# Patient Record
Sex: MALE | Race: WHITE | NOT HISPANIC OR LATINO | Employment: FULL TIME | URBAN - METROPOLITAN AREA
[De-identification: names, ages, dates, MRNs, and addresses within clinical notes are randomized per-mention and may not be internally consistent; named-entity substitution may affect disease eponyms.]

---

## 2019-02-10 ENCOUNTER — HOSPITAL ENCOUNTER (EMERGENCY)
Facility: CLINIC | Age: 49
Discharge: HOME | End: 2019-02-10
Attending: EMERGENCY MEDICINE | Admitting: EMERGENCY MEDICINE
Payer: COMMERCIAL

## 2019-02-10 ENCOUNTER — APPOINTMENT (OUTPATIENT)
Dept: CT IMAGING | Facility: CLINIC | Age: 49
End: 2019-02-10
Attending: EMERGENCY MEDICINE
Payer: COMMERCIAL

## 2019-02-10 VITALS
WEIGHT: 185 LBS | TEMPERATURE: 97.7 F | DIASTOLIC BLOOD PRESSURE: 71 MMHG | RESPIRATION RATE: 18 BRPM | OXYGEN SATURATION: 99 % | HEART RATE: 102 BPM | BODY MASS INDEX: 27.32 KG/M2 | SYSTOLIC BLOOD PRESSURE: 124 MMHG

## 2019-02-10 DIAGNOSIS — R19.7 DIARRHEA, UNSPECIFIED TYPE: ICD-10-CM

## 2019-02-10 LAB
ALBUMIN SERPL-MCNC: 3.8 G/DL (ref 3.4–5)
ALP SERPL-CCNC: 83 U/L (ref 40–150)
ALT SERPL W P-5'-P-CCNC: 47 U/L (ref 0–70)
ANION GAP SERPL CALCULATED.3IONS-SCNC: 10 MMOL/L (ref 3–14)
AST SERPL W P-5'-P-CCNC: 32 U/L (ref 0–45)
BASOPHILS # BLD AUTO: 0 10E9/L (ref 0–0.2)
BASOPHILS NFR BLD AUTO: 1.1 %
BILIRUB SERPL-MCNC: 0.9 MG/DL (ref 0.2–1.3)
BUN SERPL-MCNC: 9 MG/DL (ref 7–30)
C COLI+JEJUNI+LARI FUSA STL QL NAA+PROBE: NOT DETECTED
C DIFF TOX B STL QL: NEGATIVE
CALCIUM SERPL-MCNC: 9 MG/DL (ref 8.5–10.1)
CHLORIDE SERPL-SCNC: 100 MMOL/L (ref 94–109)
CO2 SERPL-SCNC: 23 MMOL/L (ref 20–32)
CREAT BLD-MCNC: 0.7 MG/DL (ref 0.66–1.25)
CREAT SERPL-MCNC: 0.72 MG/DL (ref 0.66–1.25)
DIFFERENTIAL METHOD BLD: ABNORMAL
EC STX1 GENE STL QL NAA+PROBE: NOT DETECTED
EC STX2 GENE STL QL NAA+PROBE: NOT DETECTED
ENTERIC PATHOGEN COMMENT: NORMAL
EOSINOPHIL # BLD AUTO: 0.1 10E9/L (ref 0–0.7)
EOSINOPHIL NFR BLD AUTO: 2.2 %
ERYTHROCYTE [DISTWIDTH] IN BLOOD BY AUTOMATED COUNT: 12.1 % (ref 10–15)
GFR SERPL CREATININE-BSD FRML MDRD: >90 ML/MIN/{1.73_M2}
GFR SERPL CREATININE-BSD FRML MDRD: >90 ML/MIN/{1.73_M2}
GLUCOSE SERPL-MCNC: 108 MG/DL (ref 70–99)
HCT VFR BLD AUTO: 42.7 % (ref 40–53)
HGB BLD-MCNC: 14.5 G/DL (ref 13.3–17.7)
IMM GRANULOCYTES # BLD: 0 10E9/L (ref 0–0.4)
IMM GRANULOCYTES NFR BLD: 0.3 %
LIPASE SERPL-CCNC: 191 U/L (ref 73–393)
LYMPHOCYTES # BLD AUTO: 1 10E9/L (ref 0.8–5.3)
LYMPHOCYTES NFR BLD AUTO: 27 %
MCH RBC QN AUTO: 30.5 PG (ref 26.5–33)
MCHC RBC AUTO-ENTMCNC: 34 G/DL (ref 31.5–36.5)
MCV RBC AUTO: 90 FL (ref 78–100)
MONOCYTES # BLD AUTO: 0.6 10E9/L (ref 0–1.3)
MONOCYTES NFR BLD AUTO: 17.1 %
NEUTROPHILS # BLD AUTO: 1.9 10E9/L (ref 1.6–8.3)
NEUTROPHILS NFR BLD AUTO: 52.3 %
NOROV GI+II ORF1-ORF2 JNC STL QL NAA+PR: NOT DETECTED
NRBC # BLD AUTO: 0 10*3/UL
NRBC BLD AUTO-RTO: 0 /100
PLATELET # BLD AUTO: 185 10E9/L (ref 150–450)
POTASSIUM SERPL-SCNC: 3.2 MMOL/L (ref 3.4–5.3)
PROT SERPL-MCNC: 8.1 G/DL (ref 6.8–8.8)
RBC # BLD AUTO: 4.75 10E12/L (ref 4.4–5.9)
RVA NSP5 STL QL NAA+PROBE: NOT DETECTED
SALMONELLA SP RPOD STL QL NAA+PROBE: NOT DETECTED
SHIGELLA SP+EIEC IPAH STL QL NAA+PROBE: NOT DETECTED
SODIUM SERPL-SCNC: 133 MMOL/L (ref 133–144)
SPECIMEN SOURCE: NORMAL
V CHOL+PARA RFBL+TRKH+TNAA STL QL NAA+PR: NOT DETECTED
WBC # BLD AUTO: 3.6 10E9/L (ref 4–11)
Y ENTERO RECN STL QL NAA+PROBE: NOT DETECTED

## 2019-02-10 PROCEDURE — 87506 IADNA-DNA/RNA PROBE TQ 6-11: CPT | Performed by: EMERGENCY MEDICINE

## 2019-02-10 PROCEDURE — 85025 COMPLETE CBC W/AUTO DIFF WBC: CPT | Performed by: EMERGENCY MEDICINE

## 2019-02-10 PROCEDURE — 25000128 H RX IP 250 OP 636: Performed by: EMERGENCY MEDICINE

## 2019-02-10 PROCEDURE — 80053 COMPREHEN METABOLIC PANEL: CPT | Performed by: EMERGENCY MEDICINE

## 2019-02-10 PROCEDURE — 82565 ASSAY OF CREATININE: CPT | Mod: 91

## 2019-02-10 PROCEDURE — 25000132 ZZH RX MED GY IP 250 OP 250 PS 637: Performed by: EMERGENCY MEDICINE

## 2019-02-10 PROCEDURE — 87493 C DIFF AMPLIFIED PROBE: CPT | Mod: 59 | Performed by: EMERGENCY MEDICINE

## 2019-02-10 PROCEDURE — 96361 HYDRATE IV INFUSION ADD-ON: CPT

## 2019-02-10 PROCEDURE — 99285 EMERGENCY DEPT VISIT HI MDM: CPT | Mod: 25

## 2019-02-10 PROCEDURE — 96374 THER/PROPH/DIAG INJ IV PUSH: CPT | Mod: 59

## 2019-02-10 PROCEDURE — 83690 ASSAY OF LIPASE: CPT | Performed by: EMERGENCY MEDICINE

## 2019-02-10 PROCEDURE — 74177 CT ABD & PELVIS W/CONTRAST: CPT

## 2019-02-10 RX ORDER — BUPROPION HYDROCHLORIDE 300 MG/1
TABLET ORAL
COMMUNITY
Start: 2018-05-21 | End: 2022-12-07

## 2019-02-10 RX ORDER — IOPAMIDOL 755 MG/ML
500 INJECTION, SOLUTION INTRAVASCULAR ONCE
Status: COMPLETED | OUTPATIENT
Start: 2019-02-10 | End: 2019-02-10

## 2019-02-10 RX ORDER — POTASSIUM CHLORIDE 1500 MG/1
40 TABLET, EXTENDED RELEASE ORAL ONCE
Status: COMPLETED | OUTPATIENT
Start: 2019-02-10 | End: 2019-02-10

## 2019-02-10 RX ORDER — ONDANSETRON 4 MG/1
4 TABLET, ORALLY DISINTEGRATING ORAL EVERY 8 HOURS PRN
Qty: 10 TABLET | Refills: 0 | Status: SHIPPED | OUTPATIENT
Start: 2019-02-10 | End: 2019-02-13

## 2019-02-10 RX ORDER — ONDANSETRON 2 MG/ML
4 INJECTION INTRAMUSCULAR; INTRAVENOUS EVERY 30 MIN PRN
Status: DISCONTINUED | OUTPATIENT
Start: 2019-02-10 | End: 2019-02-10 | Stop reason: HOSPADM

## 2019-02-10 RX ORDER — ASPIRIN 81 MG/1
81 TABLET, CHEWABLE ORAL DAILY
COMMUNITY
Start: 2018-09-07

## 2019-02-10 RX ADMIN — ONDANSETRON 4 MG: 2 INJECTION INTRAMUSCULAR; INTRAVENOUS at 10:03

## 2019-02-10 RX ADMIN — POTASSIUM CHLORIDE 40 MEQ: 1500 TABLET, EXTENDED RELEASE ORAL at 12:03

## 2019-02-10 RX ADMIN — SODIUM CHLORIDE 1000 ML: 9 INJECTION, SOLUTION INTRAVENOUS at 09:58

## 2019-02-10 RX ADMIN — SODIUM CHLORIDE 63 ML: 9 INJECTION, SOLUTION INTRAVENOUS at 11:20

## 2019-02-10 RX ADMIN — IOPAMIDOL 93 ML: 755 INJECTION, SOLUTION INTRAVENOUS at 11:20

## 2019-02-10 ASSESSMENT — ENCOUNTER SYMPTOMS
SHORTNESS OF BREATH: 0
DIARRHEA: 1
NAUSEA: 1
FEVER: 1
VOMITING: 0
ABDOMINAL PAIN: 1
BLOOD IN STOOL: 1
COUGH: 0

## 2019-02-10 NOTE — ED AVS SNAPSHOT
[facility] Emergency Department  [address]  [facility] [address]  Phone:  [phone]  Fax:  [phone]                                    Nazario Truong   MRN: [id number]    Department:  [facility] Emergency Department   Date of Visit:  2/10/2019           After Visit Summary Signature Page    I have received my discharge instructions, and my questions have been answered. I have discussed any challenges I see with this plan with the nurse or doctor.    ..........................................................................................................................................  Patient/Patient Representative Signature      ..........................................................................................................................................  Patient Representative Print Name and Relationship to Patient    ..................................................               ................................................  Date                                   Time    ..........................................................................................................................................  Reviewed by Signature/Title    ...................................................              ..............................................  Date                                               Time          22EPIC Rev 08/18

## 2019-02-10 NOTE — ED PROVIDER NOTES
History     Chief Complaint:  Diarrhea    HPI   Nazario Truong is a 48 year old male, with a history of HTN and HDL, who presents to the emergency department for evaluation of diarrhea. The patient reports he has been experiencing diarrhea, nausea, and fever for 3 days prior to evaluation. He reports some diffuse sharp abdominal pain on Friday that now exhibits as some abdominal bloating. He reports some periodical blood in his stool though none today. He reports his fever was 102 three days ago, 101 yesterday and then he had a temperature of 99 this morning. He reports 20-30 episodes of diarrhea yesterday and about 6 episodes today. He denies any vomiting, chest pain, shortness of breath, cough, or congestion. He reports he has been unable to drink or eat anything without experiencing diarrhea. He reports he has eaten some yogurt today which has been tolerated well today. He denies any recent travel or antibiotic use. He notes a history of possible colitis in past.. He denies any history of IBS.    Allergies:  No known drug allergies     Medications:    Adderall  Lipitor  Clonazepam  Flexeril  Gabapentin  Hydrodiuril  Lisinopril  Nicotine    Past Medical History:    HTN  HDL  ADD  Anxiety    Past Surgical History:    Parathyroid surgery  Appendectomy    Family History:    HTN  Lipids  CAD    Social History:  Smoking status: Former smoker of 25 years. Quit date: 1/5/10  Alcohol use: Yes  The patient presents to the emergency department by himself.  Marital Status:   [2]     Review of Systems   Constitutional: Positive for fever.   HENT: Negative for congestion.    Respiratory: Negative for cough and shortness of breath.    Cardiovascular: Negative for chest pain.   Gastrointestinal: Positive for abdominal pain, blood in stool, diarrhea and nausea. Negative for vomiting.   All other systems reviewed and are negative.    Physical Exam   Patient Vitals for the past 24 hrs:   BP Temp Temp src Pulse Resp SpO2  Weight   02/10/19 1240 -- -- -- -- 18 -- --   02/10/19 1200 124/71 -- -- 102 -- 99 % --   02/10/19 1145 127/77 -- -- 107 -- 97 % --   02/10/19 1037 131/80 -- -- 102 -- 97 % --   02/10/19 0959 -- -- -- -- -- 95 % --   02/10/19 0942 -- -- -- -- -- -- 83.9 kg (185 lb)   02/10/19 0929 (!) 166/108 97.7  F (36.5  C) Oral 120 18 98 % --     Physical Exam  General: Resting comfortably on the gurney  Eyes:  The pupils are equal and round    Conjunctivae and sclerae are normal  ENT:    Moist mucous membranes  Neck:  Normal range of motion  CV:  Tachycardic rate and regular rhythm    Skin warm and well perfused   Resp:  Lungs are clear    Non-labored    No rales    No wheezing   GI:  Abdomen is soft, there is no rigidity    No distension    No rebound tenderness     Mild left sided abdominal tenderness  MS:  Normal muscular tone  Skin:  No rash or acute skin lesions noted  Neuro:   Awake, alert.      Speech is normal and fluent.    Face is symmetric.     Moves all extremities equally  Psych: Normal affect.  Appropriate interactions.    Emergency Department Course   Imaging:  Radiographic findings were communicated with the patient who voiced understanding of the findings.    CT Abdomen Pelvis w Contrast  IMPRESSION: No acute process demonstrated in the abdomen and pelvis.  As read by Radiology.    Laboratory:  CBC: WBC 3.6 (L) o/w WNL (HGB 14.5, )  CMP: Potassium 3.2 (L), Glucose 108 (H) o/w WNL (Creatinine 0.72)  Lipase: 191  Creatinine POCT: AWNL    Clostridium difficile toxin B PCR: pending  Enteric Bacteria and Virus Panel by RIGOBERTO stool: pending    Interventions:  0958 NS 1L IV Bolus  1003 Zofran 4 mg IV    Emergency Department Course:  Past medical records, nursing notes, and vitals reviewed.  0928: I performed an exam of the patient and obtained history, as documented above.    IV inserted and blood drawn.    The patient was sent for an abdominal pelvis CT while in the emergency department, findings above.      Findings and plan explained to the Patient. Patient discharged home with instructions regarding supportive care, medications, and reasons to return. The importance of close follow-up was reviewed.   Impression & Plan    Medical Decision Making:  Nazario Truong is a 48 year old male who presents for evaluation of diarrhea. I considered a broad differential of course; the differential diagnosis of diarrhea is broad and includes such etiologies as viral gastroenteritis, traveler's diarrhea, giardia, colitis, GI bleed, bacterial infection of the large intestine (salmonella, shigella, campylobacter, e coli, etc), parasite, etc.  There are no signs of worrisome intra-abdominal pathologies detected during the visit today and no blood per patient report.  The patient has minimal left sided abdominal tenderness on exam. Did obtain stool studies which are pending. Did obtain a CT of the abdomen, which was negative. Supportive outpatient management is therefore indicated.  Abdominal pain precautions are given for home. It was discussed with him to return to the ED for increasing pain, fevers not resolving or dehydration.    Diagnosis:    ICD-10-CM   1. Diarrhea, unspecified type R19.7     Disposition:  Discharged to home with instructions for follow up.  Discharge Medications:  Started    ondansetron 4 MG ODT tab  Commonly known as:  ZOFRAN ODT  4 mg, Oral, EVERY 8 HOURS PRN       Tiffany Platt  2/10/2019   [facility] EMERGENCY DEPARTMENT  Scribe Disclosure:  Tiffany SMALLS, am serving as a scribe at 9:28 AM on 2/10/2019 to document services personally performed by Maliha Oakes MD based on my observations and the provider's statements to me.      Maliha Oakes MD  02/10/19 0013

## 2019-02-10 NOTE — DISCHARGE INSTRUCTIONS
You will receive a call if the stool studies come back abnormal  Continue the imodium  Use zofran for nausea as needed  Discharge Instructions  Adult Diarrhea    You have been seen today for diarrhea (loose stools). This is usually caused by a virus, but some bacteria, parasites, medicines, or other medical conditions can cause similar symptoms. At this time your provider does not find that your diarrhea is a sign of anything dangerous or life-threatening. However, sometimes the signs of serious illness do not show up right away. If you have new or worse symptoms, you may need to be seen again in the Emergency Department or by your primary provider.     Generally, every Emergency Department visit should have a follow-up clinic visit with either a primary or a specialty clinic/provider. Please follow-up as instructed by your emergency provider today.    Return to the Emergency Department if:  You feel you are getting dehydrated, such as being very thirsty, not urinating (peeing) like usual, or feeling faint or lightheaded.   You develop a new fever.  You have abdominal (belly) pain that seems worse than cramps, is in one spot, or is getting worse over time.   You have blood in your stool or your stool becomes black.  (Remember that if you take Pepto-Bismol , this will turn your stool black).   You feel very weak.    What can I do to help myself?  The most important thing to do is to drink clear liquids.   It is best to have only small, frequent sips of liquids. Drinking too much at once may cause more diarrhea. You should also replace minerals, sodium and potassium lost with diarrhea. Pedialyte  and sports drinks can help you replace these minerals. You can also drink clear liquids such as water, weak tea, apple juice, and 7-Up . Avoid acidic liquids (orange juice), caffeine (coffee) or alcohol. Milk products will make the diarrhea worse.  Eat bland (plain) foods. Soda crackers, toast, plain noodles, gelatin,  "applesauce and bananas are good first choices. Avoid foods that have acid, are spicy, fatty or fibrous (such as meats, coarse grains, vegetables). You may start eating these foods again in about 3 days when you are better.   Sometimes treatment includes prescription medicine to prevent diarrhea. If your provider prescribes these for you, take them as directed.   Nonprescription medicine is available for the treatment of diarrhea and can be very effective. If you use it, make sure you use the dose recommended on the package. Check with your healthcare provider before you use any medicine for diarrhea.   Do not take ibuprofen, or other nonsteroidal anti-inflammatory medicines, without checking with your healthcare provider.   Probiotics: If you have been given an antibiotic, you may want to also take a probiotic pill or eat yogurt with live cultures. Probiotics have \"good bacteria\" to help your intestines stay healthy. Studies have shown that probiotics help prevent diarrhea and other intestine problems (including C. diff infection) when you take antibiotics. You can buy these without a prescription in the pharmacy section of the store.   If you were given a prescription for medicine here today, be sure to read all of the information (including the package insert) that comes with your prescription.  This will include important information about the medicine, its side effects, and any warnings that you need to know about.  The pharmacist who fills the prescription can provide more information and answer questions you may have about the medicine.  If you have questions or concerns that the pharmacist cannot address, please call or return to the Emergency Department.  Remember that you can always come back to the Emergency Department if you are not able to see your regular provider in the amount of time listed above, if you get any new symptoms, or if there is anything that worries you.    "

## 2019-02-10 NOTE — ED TRIAGE NOTES
Pt reports 3 days of fever, vomiting, and diarrhea. Pt states he had 25-30 episodes of diarrhea yesterday, 6 so far today. Pt unable to keep much down. Pt has also been having abd pain, states now he feels bloated. Highest temp at home was 102.

## 2019-12-09 ENCOUNTER — HEALTH MAINTENANCE LETTER (OUTPATIENT)
Age: 49
End: 2019-12-09

## 2021-05-30 ENCOUNTER — RECORDS - HEALTHEAST (OUTPATIENT)
Dept: ADMINISTRATIVE | Facility: CLINIC | Age: 51
End: 2021-05-30

## 2022-05-24 ENCOUNTER — APPOINTMENT (OUTPATIENT)
Dept: GENERAL RADIOLOGY | Facility: CLINIC | Age: 52
End: 2022-05-24
Attending: EMERGENCY MEDICINE
Payer: COMMERCIAL

## 2022-05-24 ENCOUNTER — HOSPITAL ENCOUNTER (EMERGENCY)
Facility: CLINIC | Age: 52
Discharge: HOME | End: 2022-05-24
Attending: EMERGENCY MEDICINE | Admitting: EMERGENCY MEDICINE
Payer: COMMERCIAL

## 2022-05-24 VITALS
BODY MASS INDEX: 25.43 KG/M2 | OXYGEN SATURATION: 98 % | SYSTOLIC BLOOD PRESSURE: 136 MMHG | WEIGHT: 172.18 LBS | HEART RATE: 94 BPM | RESPIRATION RATE: 20 BRPM | DIASTOLIC BLOOD PRESSURE: 94 MMHG | TEMPERATURE: 98.7 F

## 2022-05-24 DIAGNOSIS — J20.9 BRONCHITIS WITH BRONCHOSPASM: ICD-10-CM

## 2022-05-24 DIAGNOSIS — R07.9 CHEST PAIN, UNSPECIFIED TYPE: ICD-10-CM

## 2022-05-24 DIAGNOSIS — R06.02 SHORTNESS OF BREATH: ICD-10-CM

## 2022-05-24 LAB
ALBUMIN UR-MCNC: NEGATIVE MG/DL
ANION GAP SERPL CALCULATED.3IONS-SCNC: 6 MMOL/L (ref 3–14)
APPEARANCE UR: CLEAR
BASOPHILS # BLD AUTO: 0.1 10E3/UL (ref 0–0.2)
BASOPHILS NFR BLD AUTO: 1 %
BILIRUB UR QL STRIP: NEGATIVE
BUN SERPL-MCNC: 18 MG/DL (ref 7–30)
CALCIUM SERPL-MCNC: 9.6 MG/DL (ref 8.5–10.1)
CHLORIDE BLD-SCNC: 100 MMOL/L (ref 94–109)
CO2 SERPL-SCNC: 27 MMOL/L (ref 20–32)
COLOR UR AUTO: NORMAL
CREAT SERPL-MCNC: 0.62 MG/DL (ref 0.66–1.25)
D DIMER PPP FEU-MCNC: 0.29 UG/ML FEU (ref 0–0.5)
EOSINOPHIL # BLD AUTO: 0.1 10E3/UL (ref 0–0.7)
EOSINOPHIL NFR BLD AUTO: 2 %
ERYTHROCYTE [DISTWIDTH] IN BLOOD BY AUTOMATED COUNT: 12.1 % (ref 10–15)
GFR SERPL CREATININE-BSD FRML MDRD: >90 ML/MIN/1.73M2
GLUCOSE BLD-MCNC: 91 MG/DL (ref 70–99)
GLUCOSE UR STRIP-MCNC: NEGATIVE MG/DL
HCT VFR BLD AUTO: 44 % (ref 40–53)
HGB BLD-MCNC: 14.9 G/DL (ref 13.3–17.7)
HGB UR QL STRIP: NEGATIVE
IMM GRANULOCYTES # BLD: 0 10E3/UL
IMM GRANULOCYTES NFR BLD: 1 %
KETONES UR STRIP-MCNC: NEGATIVE MG/DL
LEUKOCYTE ESTERASE UR QL STRIP: NEGATIVE
LYMPHOCYTES # BLD AUTO: 1.4 10E3/UL (ref 0.8–5.3)
LYMPHOCYTES NFR BLD AUTO: 22 %
MCH RBC QN AUTO: 30.6 PG (ref 26.5–33)
MCHC RBC AUTO-ENTMCNC: 33.9 G/DL (ref 31.5–36.5)
MCV RBC AUTO: 90 FL (ref 78–100)
MONOCYTES # BLD AUTO: 0.8 10E3/UL (ref 0–1.3)
MONOCYTES NFR BLD AUTO: 12 %
NEUTROPHILS # BLD AUTO: 4.1 10E3/UL (ref 1.6–8.3)
NEUTROPHILS NFR BLD AUTO: 62 %
NITRATE UR QL: NEGATIVE
NRBC # BLD AUTO: 0 10E3/UL
NRBC BLD AUTO-RTO: 0 /100
NT-PROBNP SERPL-MCNC: 8 PG/ML (ref 0–900)
PH UR STRIP: 6.5 [PH] (ref 5–7)
PLATELET # BLD AUTO: 260 10E3/UL (ref 150–450)
POTASSIUM BLD-SCNC: 3.8 MMOL/L (ref 3.4–5.3)
RBC # BLD AUTO: 4.87 10E6/UL (ref 4.4–5.9)
RBC URINE: <1 /HPF
SODIUM SERPL-SCNC: 133 MMOL/L (ref 133–144)
SP GR UR STRIP: 1.01 (ref 1–1.03)
TROPONIN I SERPL HS-MCNC: 4 NG/L
UROBILINOGEN UR STRIP-MCNC: NORMAL MG/DL
WBC # BLD AUTO: 6.5 10E3/UL (ref 4–11)
WBC URINE: 1 /HPF

## 2022-05-24 PROCEDURE — 250N000013 HC RX MED GY IP 250 OP 250 PS 637: Performed by: EMERGENCY MEDICINE

## 2022-05-24 PROCEDURE — 82435 ASSAY OF BLOOD CHLORIDE: CPT | Performed by: EMERGENCY MEDICINE

## 2022-05-24 PROCEDURE — 99285 EMERGENCY DEPT VISIT HI MDM: CPT | Mod: 25

## 2022-05-24 PROCEDURE — C9803 HOPD COVID-19 SPEC COLLECT: HCPCS

## 2022-05-24 PROCEDURE — 83880 ASSAY OF NATRIURETIC PEPTIDE: CPT | Performed by: EMERGENCY MEDICINE

## 2022-05-24 PROCEDURE — 71046 X-RAY EXAM CHEST 2 VIEWS: CPT

## 2022-05-24 PROCEDURE — 81001 URINALYSIS AUTO W/SCOPE: CPT | Performed by: EMERGENCY MEDICINE

## 2022-05-24 PROCEDURE — 93005 ELECTROCARDIOGRAM TRACING: CPT

## 2022-05-24 PROCEDURE — 94640 AIRWAY INHALATION TREATMENT: CPT

## 2022-05-24 PROCEDURE — 85379 FIBRIN DEGRADATION QUANT: CPT | Performed by: EMERGENCY MEDICINE

## 2022-05-24 PROCEDURE — 85025 COMPLETE CBC W/AUTO DIFF WBC: CPT | Performed by: EMERGENCY MEDICINE

## 2022-05-24 PROCEDURE — 36415 COLL VENOUS BLD VENIPUNCTURE: CPT | Performed by: EMERGENCY MEDICINE

## 2022-05-24 PROCEDURE — 84484 ASSAY OF TROPONIN QUANT: CPT | Performed by: EMERGENCY MEDICINE

## 2022-05-24 PROCEDURE — 81003 URINALYSIS AUTO W/O SCOPE: CPT | Performed by: EMERGENCY MEDICINE

## 2022-05-24 RX ORDER — INHALER, ASSIST DEVICES
1 SPACER (EA) MISCELLANEOUS ONCE
Status: DISCONTINUED | OUTPATIENT
Start: 2022-05-24 | End: 2022-05-24 | Stop reason: HOSPADM

## 2022-05-24 RX ORDER — CODEINE PHOSPHATE AND GUAIFENESIN 10; 100 MG/5ML; MG/5ML
1-2 SOLUTION ORAL EVERY 6 HOURS PRN
Qty: 118 ML | Refills: 0 | Status: SHIPPED | OUTPATIENT
Start: 2022-05-24 | End: 2022-12-07

## 2022-05-24 RX ORDER — ALBUTEROL SULFATE 90 UG/1
6 AEROSOL, METERED RESPIRATORY (INHALATION) ONCE
Status: COMPLETED | OUTPATIENT
Start: 2022-05-24 | End: 2022-05-24

## 2022-05-24 RX ORDER — METHYLPREDNISOLONE 4 MG
TABLET, DOSE PACK ORAL
Qty: 21 EACH | Refills: 0 | Status: SHIPPED | OUTPATIENT
Start: 2022-05-24 | End: 2022-12-07

## 2022-05-24 RX ORDER — BENZONATATE 100 MG/1
100 CAPSULE ORAL 3 TIMES DAILY PRN
Qty: 15 CAPSULE | Refills: 0 | Status: SHIPPED | OUTPATIENT
Start: 2022-05-24 | End: 2022-12-07

## 2022-05-24 RX ADMIN — ALBUTEROL SULFATE 6 PUFF: 90 AEROSOL, METERED RESPIRATORY (INHALATION) at 18:22

## 2022-05-24 ASSESSMENT — ENCOUNTER SYMPTOMS
FATIGUE: 1
COUGH: 1
GASTROINTESTINAL NEGATIVE: 1
MYALGIAS: 0
SHORTNESS OF BREATH: 1
FEVER: 0

## 2022-05-24 NOTE — ED TRIAGE NOTES
Triage Assessment     Row Name 05/24/22 0175       Triage Assessment (Adult)    Airway WDL airway symptoms;WDL       Respiratory WDL    Respiratory WDL all  lungs clear    Cough Frequency frequent    Cough Type good;nonproductive       Skin Circulation/Temperature WDL    Skin Circulation/Temperature WDL WDL       Cardiac WDL    Cardiac WDL WDL       Peripheral/Neurovascular WDL    Peripheral Neurovascular WDL WDL       Cognitive/Neuro/Behavioral WDL    Cognitive/Neuro/Behavioral WDL all    Level of Consciousness alert    Arousal Level opens eyes spontaneously    Orientation oriented x 4    Speech spontaneous;clear    Mood/Behavior anxious;calm;cooperative

## 2022-05-24 NOTE — ED TRIAGE NOTES
PT comes in with SOB and chest pain. Pt had positive covid last week. Pt felt better yesterday and was out pulling weeds when chest pain and SOB started and continued after he stopped his yard work. Pt called primary to see if he should get in sooner and was told to come to the ER. Pt has adult asthma, and has a inhaler-he did use his inhaler but not without difficulty because he was concerned that it might elevate his blood pressure. He felt his palms were sweaty, headache and he was jittery. Pt staTES he feels like his breathing has continued to worsen.

## 2022-05-24 NOTE — ED PROVIDER NOTES
History     Chief Complaint:  Shortness of Breath and Covid Concern     HPI   Nazario Truong is a 51 year old male who presents with chest pain and shortness of breath.  Patient reports testing positive for COVID-19 5/17.  Initial symptoms were low-grade fever, loss of smell, body aches, cough, shortness of breath.  Felt like his symptoms were improving throughout this week.  He is COVID vaccinated and boosted.  He did not have COVID-19 antivirals as he is outside of the window by the time he was able to get a hold of his PCP.  Yesterday, between 5-7 PM he was pulling weeds when he developed sharp sternal chest pain that lasted for 2 hours.  This eventually went away but the sensation where he could not get a full breath in addition to chest tightness and heaviness persisted throughout the day today.  There was no radiation of the pain to the jaw or down his arms.  He did feel sweaty at the time of the onset of pain.  He denies any history of exertional chest pain.  He denies any high fever, leg swelling, history of VTE.  He did take his albuterol rescue inhaler this morning which seemed to help.    ROS:  Review of Systems   Constitutional: Positive for fatigue. Negative for fever.   Respiratory: Positive for cough and shortness of breath.    Cardiovascular: Positive for chest pain. Negative for leg swelling.   Gastrointestinal: Negative.    Musculoskeletal: Negative for myalgias.   All other systems reviewed and are negative.     Allergies:  Cat Dander   Grass Pollen   Ragweed Pollen   Tree And Shrub Pollen     Medications:    albuterol (PROAIR RESPICLICK) 108 (90 Base) MCG/ACT inhaler  amphetamine-dextroamphetamine (ADDERALL) 15 MG tablet  aspirin (ASA) 81 MG chewable tablet  atorvastatin (LIPITOR) 20 MG tablet  buPROPion (WELLBUTRIN XL) 300 MG 24 hr tablet  fluticasone-vilanterol (BREO ELLIPTA) 100-25 MCG/INH inhaler  hydrochlorothiazide (HYDRODIURIL) 25 MG tablet  lisinopril (PRINIVIL,ZESTRIL) 20 MG  tablet  metFORMIN (GLUCOPHAGE) 500 MG tablet  Nicotine Polacrilex (NICORETTE MT)  clonazePAM (KLONOPIN) 1 mg tablet     dextroamphetamine-amphetamine (ADDERALL) 20 mg tablet     buPROPion (WELLBUTRIN XL) 300 mg Extended-Release tablet     EPINEPHrine (EPIPEN) 0.3 mg/0.3 mL injection    sildenafiL, pulm.hypertension, (REVATIO) 20 mg tablet    clobetasol 0.05% TOPICAL (TEMOVATE) 0.05 % external solution       Past Medical History:    ADD (attention deficit disorder)   Anxiety   HTN (hypertension)   Hyperlipidemia LDL goal < 130   Lumbar disc herniation     Past Surgical History:    Parathyroid surgery   Appendectomy      Family History:    family history includes C.A.D. in his father and mother; Hypertension in his father and mother; Lipids in his father and mother.    Social History:   reports that he quit smoking about 12 years ago. His smoking use included cigarettes. He quit after 25.00 years of use. He does not have any smokeless tobacco history on file. He reports current alcohol use. He reports that he does not use drugs.  PCP: [facility], [place]     Physical Exam     Patient Vitals for the past 24 hrs:   BP Temp Temp src Pulse Resp SpO2 Weight   05/24/22 1830 (!) 143/92 -- -- 83 -- 95 % --   05/24/22 1750 -- -- -- 83 -- 97 % --   05/24/22 1745 (!) 155/98 -- -- -- -- -- --   05/24/22 1405 (!) 159/80 98.7  F (37.1  C) Oral -- 20 97 % 78.1 kg (172 lb 2.9 oz)      Physical Exam  General: Alert, no acute distress; speaking in full sentences; occasional dry cough.  HEENT:  Moist mucous membranes. Conjunctiva normal.   CV:  RRR, no m/r/g, skin warm and well perfused  Pulm:  Faint expiratory wheezing bilaterally.  No rales. No acute distress, breathing comfortably.  GI:  Soft, nontender, nondistended.  No rebound or guarding.    MSK:  Moving all extremities.  No focal areas of edema, erythema, or tenderness  Skin:  WWP, no rashes, no lower extremity edema, skin color normal, no diaphoresis    Emergency  Department Course   ECG:  ECG taken at 1746, ECG read at 1753  Normal sinus rhythm    Rightward axis  Nonspecific ST and T wave abnormality  Abnormal ECG   Rate 80 bpm. ID interval 150 ms. QRS duration 102 ms. QT/QTc 374/431 ms. P-R-T axes * 94 165.     No results found for this or any previous visit.    Imaging:  Chest XR,  PA & LAT   Final Result   IMPRESSION: Negative chest.         Report per radiology    Laboratory:  Labs Ordered and Resulted from Time of ED Arrival to Time of ED Departure   BASIC METABOLIC PANEL - Abnormal       Result Value    Sodium 133      Potassium 3.8      Chloride 100      Carbon Dioxide (CO2) 27      Anion Gap 6      Urea Nitrogen 18      Creatinine 0.62 (*)     Calcium 9.6      Glucose 91      GFR Estimate >90     ROUTINE UA WITH MICROSCOPIC REFLEX TO CULTURE - Normal    Color Urine Straw      Appearance Urine Clear      Glucose Urine Negative      Bilirubin Urine Negative      Ketones Urine Negative      Specific Gravity Urine 1.007      Blood Urine Negative      pH Urine 6.5      Protein Albumin Urine Negative      Urobilinogen Urine Normal      Nitrite Urine Negative      Leukocyte Esterase Urine Negative      RBC Urine <1      WBC Urine 1     NT PROBNP INPATIENT - Normal    N terminal Pro BNP Inpatient 8     TROPONIN I - Normal    Troponin I High Sensitivity 4     D DIMER QUANTITATIVE - Normal    D-Dimer Quantitative 0.29     CBC WITH PLATELETS AND DIFFERENTIAL    WBC Count 6.5      RBC Count 4.87      Hemoglobin 14.9      Hematocrit 44.0      MCV 90      MCH 30.6      MCHC 33.9      RDW 12.1      Platelet Count 260      % Neutrophils 62      % Lymphocytes 22      % Monocytes 12      % Eosinophils 2      % Basophils 1      % Immature Granulocytes 1      NRBCs per 100 WBC 0      Absolute Neutrophils 4.1      Absolute Lymphocytes 1.4      Absolute Monocytes 0.8      Absolute Eosinophils 0.1      Absolute Basophils 0.1      Absolute Immature Granulocytes 0.0      Absolute NRBCs 0.0         Procedures     Emergency Department Course:     Reviewed:   I reviewed nursing notes, vitals, past medical history and Care Everywhere    Assessments:  1738 I obtained history and examined the patient as noted above.   1901 I rechecked the patient and explained findings.     Interventions:  1822  albuterol (PROVENTIL HFA/VENTOLIN HFA) inhaler 6 puff, Inhalation      Disposition:  The patient was discharged to home.     Impression & Plan      Medical Decision Making:  Nazario Truong is a 51 year old male who presents to the ER for evaluation of chest pain, shortness of breath, cough in setting of COVID-19 positive diagnosis 5/17.  He is afebrile and vitally stable and not hypoxic.  He does not appear to be in any respiratory distress.  Faint wheezing heard throughout lung fields on expiration.  Broad differential was considered including ACS, PE, pneumothorax, COVID-19 pneumonia, superimposed bacterial pneumonia, pleural effusion, bronchitis and bronchospasms amongst other things.  EKG shows no acute ischemic changes.  No signs of pericarditis.  No previous EKGs to compare.  Troponin is normal despite greater than 6 hours of ongoing symptoms ruling out ACS.  Patient's heart score is low.  Patient is low risk for PE and D-dimer is negative making this unlikely.  The rest of his basic lab studies are unremarkable.  He does not appear to be fluid overloaded and BNP is normal.  Chest x-ray shows no acute findings.  Suspect acute bronchitis with bronchospasms given reassuring work-up and exam.  With reasonable clinical certainty, he is safe to discharge home with the below medications.  Follow-up with PCP discussed regarding today's visit.  He is comfortable with this plan.  Reasons to return to the ER were discussed and all questions were answered prior to discharge    Diagnosis:    ICD-10-CM    1. Chest pain, unspecified type  R07.9    2. Shortness of breath  R06.02    3. Bronchitis with bronchospasm  J20.9        Discharge Medications:  New Prescriptions    BENZONATATE (TESSALON) 100 MG CAPSULE    Take 1 capsule (100 mg) by mouth 3 times daily as needed for cough    GUAIFENESIN-CODEINE (ROBITUSSIN AC) 100-10 MG/5ML SOLUTION    Take 5-10 mLs by mouth every 6 hours as needed for cough    METHYLPREDNISOLONE (MEDROL DOSEPAK) 4 MG TABLET THERAPY PACK    Take as directed on the therapy pack      5/24/2022   Zachary Ndiaye MD [place], Edgar Ronald, MD  05/24/22 1949

## 2022-05-24 NOTE — ED TRIAGE NOTES
PT comes in with SOB and chest pain. Pt had positive covid last week. Pt felt better yesterday and was out pulling weeds when chest pain and SOB started and continued after he stopped his yard work. Pt called primary to see if he should get in sooner and was told to come to the ER. Pt has adult asthma, and has a inhaler-he did use his inhaler but not without difficulty because he was concerned that it might elevate his blood pressure. He felt his palms were sweaty, headache and he was jittery. Pt staTES he feels like his breathing has continued to worsen.     Triage Assessment     Row Name 05/24/22 1559       Triage Assessment (Adult)    Airway WDL airway symptoms;WDL       Respiratory WDL    Respiratory WDL all  lungs clear    Cough Frequency frequent    Cough Type good;nonproductive       Skin Circulation/Temperature WDL    Skin Circulation/Temperature WDL WDL       Cardiac WDL    Cardiac WDL WDL       Peripheral/Neurovascular WDL    Peripheral Neurovascular WDL WDL       Cognitive/Neuro/Behavioral WDL    Cognitive/Neuro/Behavioral WDL all    Level of Consciousness alert    Arousal Level opens eyes spontaneously    Orientation oriented x 4    Speech spontaneous;clear    Mood/Behavior anxious;calm;cooperative

## 2022-05-25 NOTE — DISCHARGE INSTRUCTIONS

## 2022-06-03 ENCOUNTER — APPOINTMENT (OUTPATIENT)
Dept: URBAN - METROPOLITAN AREA CLINIC 253 | Age: 52
Setting detail: DERMATOLOGY
End: 2022-06-03

## 2022-06-03 VITALS — WEIGHT: 165 LBS | HEIGHT: 68 IN | RESPIRATION RATE: 15 BRPM

## 2022-06-03 DIAGNOSIS — L40.0 PSORIASIS VULGARIS: ICD-10-CM

## 2022-06-03 PROBLEM — L30.9 DERMATITIS, UNSPECIFIED: Status: ACTIVE | Noted: 2022-06-03

## 2022-06-03 PROCEDURE — OTHER MIPS QUALITY: OTHER

## 2022-06-03 PROCEDURE — OTHER PRESCRIPTION: OTHER

## 2022-06-03 PROCEDURE — OTHER COUNSELING: OTHER

## 2022-06-03 PROCEDURE — 99203 OFFICE O/P NEW LOW 30 MIN: CPT

## 2022-06-03 PROCEDURE — OTHER ADDITIONAL NOTES: OTHER

## 2022-06-03 RX ORDER — KETOCONAZOLE 20 MG/ML
2% SHAMPOO, SUSPENSION TOPICAL QOD
Qty: 120 | Refills: 3 | Status: ERX | COMMUNITY
Start: 2022-06-03

## 2022-06-03 RX ORDER — CLOBETASOL PROPIONATE 0.46 MG/ML
0.05% SOLUTION TOPICAL BID
Qty: 25 | Refills: 3 | Status: ERX | COMMUNITY
Start: 2022-06-03

## 2022-06-03 ASSESSMENT — LOCATION ZONE DERM: LOCATION ZONE: SCALP

## 2022-06-03 ASSESSMENT — LOCATION SIMPLE DESCRIPTION DERM: LOCATION SIMPLE: LEFT SCALP

## 2022-06-03 ASSESSMENT — LOCATION DETAILED DESCRIPTION DERM: LOCATION DETAILED: LEFT MEDIAL FRONTAL SCALP

## 2022-06-03 NOTE — PROCEDURE: ADDITIONAL NOTES
Additional Notes: Alternate ketoconazole with your nizoral psoriasis shampoo.\\nUse topical steroids more consistently and aggressively. If rash returns, RTC. Today appears mostly clear, just some redness. If still very itchy, RTC and can discuss further or consider biopsy. \\n\\nA clinical assistant was present for the exam. Care instructions of site were explained to the patient in detail. Told patient to call with any concerns or questions. The patient verbalized understanding and agreement of the education provided and the treatment plan. Encouraged patient to schedule a follow up appointment right after visit. At the end of the visit, all questions had been answered and the patient was satisfied with the visit.
Detail Level: Simple
Render Risk Assessment In Note?: no

## 2022-06-03 NOTE — HPI: RASH
What Type Of Note Output Would You Prefer (Optional)?: Standard Output
Is This A New Presentation, Or A Follow-Up?: Rash
Additional History: Rash on scalp. Itchy. He wonders if there’s more than one thing going on. It’s scaly and itchy, scabs by his bald spot. His GP thought it could be PSO but she wasn’t sure. She gave him a topical steroid. This maybe helped with some itching, but he doesn’t think it’s resolved the issue. He also gets hard bumps, almost like pimples but they’re very hard.

## 2022-07-08 ENCOUNTER — APPOINTMENT (OUTPATIENT)
Dept: URBAN - METROPOLITAN AREA CLINIC 253 | Age: 52
Setting detail: DERMATOLOGY
End: 2022-07-08

## 2022-07-08 VITALS — HEIGHT: 68 IN | WEIGHT: 165 LBS | RESPIRATION RATE: 14 BRPM

## 2022-07-08 DIAGNOSIS — Z71.89 OTHER SPECIFIED COUNSELING: ICD-10-CM

## 2022-07-08 DIAGNOSIS — D18.0 HEMANGIOMA: ICD-10-CM

## 2022-07-08 DIAGNOSIS — D22 MELANOCYTIC NEVI: ICD-10-CM

## 2022-07-08 DIAGNOSIS — L21.8 OTHER SEBORRHEIC DERMATITIS: ICD-10-CM

## 2022-07-08 DIAGNOSIS — L82.1 OTHER SEBORRHEIC KERATOSIS: ICD-10-CM

## 2022-07-08 DIAGNOSIS — L81.4 OTHER MELANIN HYPERPIGMENTATION: ICD-10-CM

## 2022-07-08 DIAGNOSIS — Z80.8 FAMILY HISTORY OF MALIGNANT NEOPLASM OF OTHER ORGANS OR SYSTEMS: ICD-10-CM

## 2022-07-08 PROBLEM — D22.5 MELANOCYTIC NEVI OF TRUNK: Status: ACTIVE | Noted: 2022-07-08

## 2022-07-08 PROBLEM — L30.9 DERMATITIS, UNSPECIFIED: Status: ACTIVE | Noted: 2022-07-08

## 2022-07-08 PROBLEM — D18.01 HEMANGIOMA OF SKIN AND SUBCUTANEOUS TISSUE: Status: ACTIVE | Noted: 2022-07-08

## 2022-07-08 PROBLEM — D22.72 MELANOCYTIC NEVI OF LEFT LOWER LIMB, INCLUDING HIP: Status: ACTIVE | Noted: 2022-07-08

## 2022-07-08 PROCEDURE — OTHER PHOTO-DOCUMENTATION: OTHER

## 2022-07-08 PROCEDURE — 99213 OFFICE O/P EST LOW 20 MIN: CPT | Mod: 25

## 2022-07-08 PROCEDURE — OTHER MIPS QUALITY: OTHER

## 2022-07-08 PROCEDURE — OTHER COUNSELING: OTHER

## 2022-07-08 PROCEDURE — OTHER ADDITIONAL NOTES: OTHER

## 2022-07-08 PROCEDURE — OTHER BIOPSY BY SHAVE METHOD: OTHER

## 2022-07-08 PROCEDURE — 11102 TANGNTL BX SKIN SINGLE LES: CPT

## 2022-07-08 ASSESSMENT — LOCATION DETAILED DESCRIPTION DERM
LOCATION DETAILED: LEFT INSTEP
LOCATION DETAILED: POSTERIOR MID-PARIETAL SCALP
LOCATION DETAILED: INFERIOR THORACIC SPINE

## 2022-07-08 ASSESSMENT — LOCATION ZONE DERM
LOCATION ZONE: SCALP
LOCATION ZONE: FEET
LOCATION ZONE: TRUNK

## 2022-07-08 ASSESSMENT — LOCATION SIMPLE DESCRIPTION DERM
LOCATION SIMPLE: POSTERIOR SCALP
LOCATION SIMPLE: UPPER BACK
LOCATION SIMPLE: LEFT PLANTAR SURFACE

## 2022-07-08 NOTE — HPI: FULL BODY SKIN EXAMINATION
What Type Of Note Output Would You Prefer (Optional)?: Standard Output
What Is The Reason For Today's Visit?: Full Body Skin Examination
What Is The Reason For Today's Visit? (Being Monitored For X): the re-examination of lesions previously examined
Additional History: FBE, no specific concerns today. Lots of moles he is concerns about. \\n\\nAlso following up on the scalp. He thinks it’s gotten worse. At first it got better. It came back and is more itchy. It was grayish scales. He has been trying not to scratch. He used the Rxs as recommended, he was using the steroid about every day.

## 2022-07-08 NOTE — PROCEDURE: ADDITIONAL NOTES
Additional Notes: Advised pt follow up in 2 months to recheck. Monitor for changes in the mean time and RTC should he notice any
Detail Level: Simple
Render Risk Assessment In Note?: no
Additional Notes: Recommended pt increase frequency of topical steroid use
Additional Notes: A clinical assistant was present for the exam. Care instructions of biopsied site were explained to the patient in detail. Told patient to call with any concerns or questions. The patient verbalized understanding and agreement of the education provided and the treatment plan. Encouraged patient to schedule a follow up appointment right after visit. At the end of the visit, all questions had been answered and the patient was satisfied with the visit.

## 2022-07-08 NOTE — PROCEDURE: BIOPSY BY SHAVE METHOD
Bill 54630 For Specimen Handling/Conveyance To Laboratory?: no
EOMI; PERRL; no drainage or redness
Type Of Destruction Used: Curettage
Biopsy Method: Dermablade
Wound Care: Petrolatum
Post-Care Instructions: I reviewed with the patient in detail post-care instructions. Patient is to keep the biopsy site dry overnight, and then apply bacitracin twice daily until healed. Patient may apply hydrogen peroxide soaks to remove any crusting.
Curettage Text: The wound bed was treated with curettage after the biopsy was performed.
Depth Of Biopsy: dermis
Anesthesia Volume In Cc (Will Not Render If 0): 0.5
Dressing: bandage
Billing Type: Third-Party Bill
Information: Selecting Yes will display possible errors in your note based on the variables you have selected. This validation is only offered as a suggestion for you. PLEASE NOTE THAT THE VALIDATION TEXT WILL BE REMOVED WHEN YOU FINALIZE YOUR NOTE. IF YOU WANT TO FAX A PRELIMINARY NOTE YOU WILL NEED TO TOGGLE THIS TO 'NO' IF YOU DO NOT WANT IT IN YOUR FAXED NOTE.
Additional Anesthesia Volume In Cc (Will Not Render If 0): 0
Was A Bandage Applied: Yes
Electrodesiccation Text: The wound bed was treated with electrodesiccation after the biopsy was performed.
Hemostasis: Drysol
Silver Nitrate Text: The wound bed was treated with silver nitrate after the biopsy was performed.
Biopsy Type: H and E
Consent: Written consent was obtained and risks were reviewed including but not limited to scarring, infection, bleeding, scabbing, incomplete removal, nerve damage and allergy to anesthesia.
Detail Level: Detailed
Electrodesiccation And Curettage Text: The wound bed was treated with electrodesiccation and curettage after the biopsy was performed.
Cryotherapy Text: The wound bed was treated with cryotherapy after the biopsy was performed.
Anesthesia Type: 1% lidocaine with epinephrine
Notification Instructions: Patient will be notified of biopsy results. However, patient instructed to call the office if not contacted within 2 weeks.

## 2022-08-11 ENCOUNTER — APPOINTMENT (OUTPATIENT)
Dept: URBAN - METROPOLITAN AREA CLINIC 255 | Age: 52
Setting detail: DERMATOLOGY
End: 2022-08-12

## 2022-08-11 PROBLEM — C44.41 BASAL CELL CARCINOMA OF SKIN OF SCALP AND NECK: Status: ACTIVE | Noted: 2022-08-11

## 2022-08-11 PROCEDURE — 13121 CMPLX RPR S/A/L 2.6-7.5 CM: CPT

## 2022-08-11 PROCEDURE — 17312 MOHS ADDL STAGE: CPT

## 2022-08-11 PROCEDURE — OTHER MOHS SURGERY: OTHER

## 2022-08-11 PROCEDURE — OTHER MIPS QUALITY: OTHER

## 2022-08-11 PROCEDURE — 17311 MOHS 1 STAGE H/N/HF/G: CPT

## 2022-08-11 NOTE — PROCEDURE: MOHS SURGERY
13-Apr-2017 21:26 Mohs Method Verbiage: An incision at a 45 degree angle following the standard Mohs approach was done and the specimen was harvested as a microscopic controlled layer.

## 2022-08-11 NOTE — PROCEDURE: MOHS SURGERY
Body Location Override (Optional - Billing Will Still Be Based On Selected Body Map Location If Applicable): Posterior Mid Parietal Scalp

## 2022-08-11 NOTE — PROCEDURE: MOHS SURGERY
Ear Star Wedge Flap Text: The defect edges were debeveled with a #15 blade scalpel.  Given the location of the defect and the proximity to free margins (helical rim) an ear star wedge flap was deemed most appropriate.  Using a sterile surgical marker, the appropriate flap was drawn incorporating the defect and placing the expected incisions between the helical rim and antihelix where possible.  The area thus outlined was incised through and through with a #15 scalpel blade. Enbrel Counseling:  I discussed with the patient the risks of etanercept including but not limited to myelosuppression, immunosuppression, autoimmune hepatitis, demyelinating diseases, lymphoma, and infections.  The patient understands that monitoring is required including a PPD at baseline and must alert us or the primary physician if symptoms of infection or other concerning signs are noted.

## 2022-08-11 NOTE — PROCEDURE: MIPS QUALITY
Quality 226: Preventive Care And Screening: Tobacco Use: Screening And Cessation Intervention: Patient screened for tobacco use and is an ex/non-smoker
Quality 130: Documentation Of Current Medications In The Medical Record: Current Medications Documented
Detail Level: Detailed
Quality 431: Preventive Care And Screening: Unhealthy Alcohol Use - Screening: Patient not identified as an unhealthy alcohol user when screened for unhealthy alcohol use using a systematic screening method
Quality 143: Oncology: Medical And Radiation- Pain Intensity Quantified: Pain severity quantified, no pain present
Quality 110: Preventive Care And Screening: Influenza Immunization: Influenza Immunization previously received during influenza season

## 2022-08-22 ENCOUNTER — APPOINTMENT (OUTPATIENT)
Dept: URBAN - METROPOLITAN AREA CLINIC 253 | Age: 52
Setting detail: DERMATOLOGY
End: 2022-08-23

## 2022-08-22 DIAGNOSIS — L81.4 OTHER MELANIN HYPERPIGMENTATION: ICD-10-CM

## 2022-08-22 DIAGNOSIS — Z71.89 OTHER SPECIFIED COUNSELING: ICD-10-CM

## 2022-08-22 DIAGNOSIS — Z48.02 ENCOUNTER FOR REMOVAL OF SUTURES: ICD-10-CM

## 2022-08-22 DIAGNOSIS — L57.0 ACTINIC KERATOSIS: ICD-10-CM

## 2022-08-22 PROCEDURE — 17000 DESTRUCT PREMALG LESION: CPT

## 2022-08-22 PROCEDURE — OTHER LIQUID NITROGEN: OTHER

## 2022-08-22 PROCEDURE — OTHER SUTURE REMOVAL (GLOBAL PERIOD): OTHER

## 2022-08-22 PROCEDURE — 99213 OFFICE O/P EST LOW 20 MIN: CPT | Mod: 25

## 2022-08-22 PROCEDURE — OTHER COUNSELING: OTHER

## 2022-08-22 ASSESSMENT — LOCATION DETAILED DESCRIPTION DERM
LOCATION DETAILED: LEFT SUPERIOR MEDIAL FOREHEAD
LOCATION DETAILED: LEFT MEDIAL FRONTAL SCALP
LOCATION DETAILED: LEFT SUPERIOR PARIETAL SCALP

## 2022-08-22 ASSESSMENT — LOCATION SIMPLE DESCRIPTION DERM
LOCATION SIMPLE: SCALP
LOCATION SIMPLE: LEFT SCALP
LOCATION SIMPLE: LEFT FOREHEAD

## 2022-08-22 ASSESSMENT — LOCATION ZONE DERM
LOCATION ZONE: FACE
LOCATION ZONE: SCALP

## 2022-08-22 NOTE — PROCEDURE: SUTURE REMOVAL (GLOBAL PERIOD)
Detail Level: Detailed
Add 75003 Cpt? (Important Note: In 2017 The Use Of 90392 Is Being Tracked By Cms To Determine Future Global Period Reimbursement For Global Periods): no

## 2022-08-22 NOTE — PROCEDURE: LIQUID NITROGEN
Post-Care Instructions: I reviewed with the patient in detail post-care instructions. Patient is to wear sunprotection, and avoid picking at any of the treated lesions. Pt may apply Vaseline to crusted or scabbing areas.
Render Post-Care Instructions In Note?: yes
Duration Of Freeze Thaw-Cycle (Seconds): 2
Consent: The patient's consent was obtained including but not limited to risks of crusting, scabbing, blistering, scarring, darker or lighter pigmentary change, recurrence, incomplete removal and infection.
Number Of Freeze-Thaw Cycles: 3 freeze-thaw cycles
Render Note In Bullet Format When Appropriate: No
Detail Level: Detailed

## 2022-09-07 ENCOUNTER — RX ONLY (RX ONLY)
Age: 52
End: 2022-09-07

## 2022-09-07 RX ORDER — MUPIROCIN 20 MG/G
OINTMENT TOPICAL
Qty: 22 | Refills: 0 | Status: ERX | COMMUNITY
Start: 2022-09-07

## 2022-09-12 ENCOUNTER — APPOINTMENT (OUTPATIENT)
Dept: URBAN - METROPOLITAN AREA CLINIC 253 | Age: 52
Setting detail: DERMATOLOGY
End: 2022-09-14

## 2022-09-12 DIAGNOSIS — Z48.817 ENCOUNTER FOR SURGICAL AFTERCARE FOLLOWING SURGERY ON THE SKIN AND SUBCUTANEOUS TISSUE: ICD-10-CM

## 2022-09-12 PROCEDURE — OTHER COUNSELING: OTHER

## 2022-09-12 PROCEDURE — OTHER ADDITIONAL NOTES: OTHER

## 2022-09-12 PROCEDURE — OTHER PHOTO-DOCUMENTATION: OTHER

## 2022-09-12 ASSESSMENT — LOCATION ZONE DERM: LOCATION ZONE: SCALP

## 2022-09-12 ASSESSMENT — LOCATION SIMPLE DESCRIPTION DERM: LOCATION SIMPLE: POSTERIOR SCALP

## 2022-09-12 ASSESSMENT — LOCATION DETAILED DESCRIPTION DERM: LOCATION DETAILED: POSTERIOR MID-PARIETAL SCALP

## 2022-09-12 NOTE — HPI: WOUND CHECK (POST-OP)
Date Of Procedure: 8/11/22
Additional History: Pt took photos of his wound daily since it opened up after having sutures removed. He has been cleaning daily with hydrogen peroxide and applying mupriocin that AR prescribed on Friday (9/9/22)

## 2022-09-19 ENCOUNTER — APPOINTMENT (OUTPATIENT)
Dept: URBAN - METROPOLITAN AREA CLINIC 253 | Age: 52
Setting detail: DERMATOLOGY
End: 2022-09-21

## 2022-09-19 DIAGNOSIS — Z48.817 ENCOUNTER FOR SURGICAL AFTERCARE FOLLOWING SURGERY ON THE SKIN AND SUBCUTANEOUS TISSUE: ICD-10-CM

## 2022-09-19 PROCEDURE — OTHER ADDITIONAL NOTES: OTHER

## 2022-09-19 PROCEDURE — OTHER PHOTO-DOCUMENTATION: OTHER

## 2022-09-19 PROCEDURE — OTHER COUNSELING: OTHER

## 2022-09-19 ASSESSMENT — LOCATION SIMPLE DESCRIPTION DERM: LOCATION SIMPLE: POSTERIOR SCALP

## 2022-09-19 ASSESSMENT — LOCATION DETAILED DESCRIPTION DERM: LOCATION DETAILED: POSTERIOR MID-PARIETAL SCALP

## 2022-09-19 ASSESSMENT — LOCATION ZONE DERM: LOCATION ZONE: SCALP

## 2022-09-19 NOTE — PROCEDURE: ADDITIONAL NOTES
Render Risk Assessment In Note?: no
Additional Notes: AR came into room and removed a piece of crust off the site. \\nRecommended Vit. E QD x 1 month.
Detail Level: Detailed

## 2022-12-01 ENCOUNTER — HOSPITAL ENCOUNTER (OUTPATIENT)
Dept: CT IMAGING | Facility: CLINIC | Age: 52
Discharge: HOME | End: 2022-12-01
Attending: INTERNAL MEDICINE | Admitting: INTERNAL MEDICINE
Payer: COMMERCIAL

## 2022-12-01 DIAGNOSIS — Z87.891 HISTORY OF TOBACCO USE: ICD-10-CM

## 2022-12-01 PROCEDURE — 71271 CT THORAX LUNG CANCER SCR C-: CPT

## 2022-12-07 ENCOUNTER — HOSPITAL ENCOUNTER (OUTPATIENT)
Facility: CLINIC | Age: 52
Setting detail: OBSERVATION
Discharge: HOME | End: 2022-12-09
Attending: EMERGENCY MEDICINE | Admitting: INTERNAL MEDICINE
Payer: COMMERCIAL

## 2022-12-07 ENCOUNTER — APPOINTMENT (OUTPATIENT)
Dept: GENERAL RADIOLOGY | Facility: CLINIC | Age: 52
End: 2022-12-07
Attending: EMERGENCY MEDICINE
Payer: COMMERCIAL

## 2022-12-07 ENCOUNTER — APPOINTMENT (OUTPATIENT)
Dept: CARDIOLOGY | Facility: CLINIC | Age: 52
End: 2022-12-07
Attending: EMERGENCY MEDICINE
Payer: COMMERCIAL

## 2022-12-07 DIAGNOSIS — R91.1 SOLITARY PULMONARY NODULE: ICD-10-CM

## 2022-12-07 DIAGNOSIS — I25.10 CORONARY ARTERY DISEASE INVOLVING NATIVE CORONARY ARTERY OF NATIVE HEART, UNSPECIFIED WHETHER ANGINA PRESENT: Primary | ICD-10-CM

## 2022-12-07 DIAGNOSIS — R07.9 EXERTIONAL CHEST PAIN: ICD-10-CM

## 2022-12-07 DIAGNOSIS — R94.39 ABNORMAL STRESS ECHO: ICD-10-CM

## 2022-12-07 LAB
ANION GAP SERPL CALCULATED.3IONS-SCNC: 10 MMOL/L (ref 7–15)
BUN SERPL-MCNC: 15.4 MG/DL (ref 6–20)
CALCIUM SERPL-MCNC: 9.6 MG/DL (ref 8.6–10)
CHLORIDE SERPL-SCNC: 96 MMOL/L (ref 98–107)
CHOLEST SERPL-MCNC: 214 MG/DL
CREAT SERPL-MCNC: 0.73 MG/DL (ref 0.67–1.17)
D DIMER PPP FEU-MCNC: 0.28 UG/ML FEU (ref 0–0.5)
DEPRECATED HCO3 PLAS-SCNC: 27 MMOL/L (ref 22–29)
ERYTHROCYTE [DISTWIDTH] IN BLOOD BY AUTOMATED COUNT: 12.2 % (ref 10–15)
GFR SERPL CREATININE-BSD FRML MDRD: >90 ML/MIN/1.73M2
GLUCOSE SERPL-MCNC: 102 MG/DL (ref 70–99)
HCT VFR BLD AUTO: 41.4 % (ref 40–53)
HDLC SERPL-MCNC: 58 MG/DL
HGB BLD-MCNC: 13.9 G/DL (ref 13.3–17.7)
HOLD SPECIMEN: NORMAL
LDLC SERPL CALC-MCNC: 128 MG/DL
MCH RBC QN AUTO: 31.2 PG (ref 26.5–33)
MCHC RBC AUTO-ENTMCNC: 33.6 G/DL (ref 31.5–36.5)
MCV RBC AUTO: 93 FL (ref 78–100)
NONHDLC SERPL-MCNC: 156 MG/DL
NT-PROBNP SERPL-MCNC: 13 PG/ML (ref 0–900)
PLATELET # BLD AUTO: 234 10E3/UL (ref 150–450)
POTASSIUM SERPL-SCNC: 3.8 MMOL/L (ref 3.4–5.3)
RBC # BLD AUTO: 4.45 10E6/UL (ref 4.4–5.9)
SARS-COV-2 RNA RESP QL NAA+PROBE: NEGATIVE
SODIUM SERPL-SCNC: 133 MMOL/L (ref 136–145)
TRIGL SERPL-MCNC: 142 MG/DL
TROPONIN T SERPL HS-MCNC: 6 NG/L
TROPONIN T SERPL HS-MCNC: <6 NG/L
WBC # BLD AUTO: 4.5 10E3/UL (ref 4–11)

## 2022-12-07 PROCEDURE — 99223 1ST HOSP IP/OBS HIGH 75: CPT | Mod: AI

## 2022-12-07 PROCEDURE — 85014 HEMATOCRIT: CPT | Performed by: EMERGENCY MEDICINE

## 2022-12-07 PROCEDURE — 93321 DOPPLER ECHO F-UP/LMTD STD: CPT | Mod: 26 | Performed by: INTERNAL MEDICINE

## 2022-12-07 PROCEDURE — 71046 X-RAY EXAM CHEST 2 VIEWS: CPT

## 2022-12-07 PROCEDURE — 93018 CV STRESS TEST I&R ONLY: CPT | Performed by: INTERNAL MEDICINE

## 2022-12-07 PROCEDURE — 93321 DOPPLER ECHO F-UP/LMTD STD: CPT | Mod: TC

## 2022-12-07 PROCEDURE — 93325 DOPPLER ECHO COLOR FLOW MAPG: CPT | Mod: 26 | Performed by: INTERNAL MEDICINE

## 2022-12-07 PROCEDURE — 255N000002 HC RX 255 OP 636: Performed by: EMERGENCY MEDICINE

## 2022-12-07 PROCEDURE — 120N000001 HC R&B MED SURG/OB

## 2022-12-07 PROCEDURE — 80061 LIPID PANEL: CPT

## 2022-12-07 PROCEDURE — 250N000013 HC RX MED GY IP 250 OP 250 PS 637: Performed by: INTERNAL MEDICINE

## 2022-12-07 PROCEDURE — 250N000013 HC RX MED GY IP 250 OP 250 PS 637: Performed by: EMERGENCY MEDICINE

## 2022-12-07 PROCEDURE — 250N000013 HC RX MED GY IP 250 OP 250 PS 637

## 2022-12-07 PROCEDURE — 93005 ELECTROCARDIOGRAM TRACING: CPT

## 2022-12-07 PROCEDURE — 36415 COLL VENOUS BLD VENIPUNCTURE: CPT

## 2022-12-07 PROCEDURE — 80048 BASIC METABOLIC PNL TOTAL CA: CPT | Performed by: EMERGENCY MEDICINE

## 2022-12-07 PROCEDURE — C9803 HOPD COVID-19 SPEC COLLECT: HCPCS

## 2022-12-07 PROCEDURE — 83880 ASSAY OF NATRIURETIC PEPTIDE: CPT | Performed by: EMERGENCY MEDICINE

## 2022-12-07 PROCEDURE — 84484 ASSAY OF TROPONIN QUANT: CPT

## 2022-12-07 PROCEDURE — U0003 INFECTIOUS AGENT DETECTION BY NUCLEIC ACID (DNA OR RNA); SEVERE ACUTE RESPIRATORY SYNDROME CORONAVIRUS 2 (SARS-COV-2) (CORONAVIRUS DISEASE [COVID-19]), AMPLIFIED PROBE TECHNIQUE, MAKING USE OF HIGH THROUGHPUT TECHNOLOGIES AS DESCRIBED BY CMS-2020-01-R: HCPCS

## 2022-12-07 PROCEDURE — 85379 FIBRIN DEGRADATION QUANT: CPT | Performed by: EMERGENCY MEDICINE

## 2022-12-07 PROCEDURE — 36415 COLL VENOUS BLD VENIPUNCTURE: CPT | Performed by: EMERGENCY MEDICINE

## 2022-12-07 PROCEDURE — 93016 CV STRESS TEST SUPVJ ONLY: CPT | Performed by: INTERNAL MEDICINE

## 2022-12-07 PROCEDURE — 93350 STRESS TTE ONLY: CPT | Mod: 26 | Performed by: INTERNAL MEDICINE

## 2022-12-07 PROCEDURE — 84484 ASSAY OF TROPONIN QUANT: CPT | Performed by: EMERGENCY MEDICINE

## 2022-12-07 PROCEDURE — 99285 EMERGENCY DEPT VISIT HI MDM: CPT | Mod: 25

## 2022-12-07 RX ORDER — FLUTICASONE FUROATE AND VILANTEROL 100; 25 UG/1; UG/1
1 POWDER RESPIRATORY (INHALATION) DAILY
Status: DISCONTINUED | OUTPATIENT
Start: 2022-12-08 | End: 2022-12-09 | Stop reason: HOSPADM

## 2022-12-07 RX ORDER — NITROGLYCERIN 0.4 MG/1
0.4 TABLET SUBLINGUAL EVERY 5 MIN PRN
Status: DISCONTINUED | OUTPATIENT
Start: 2022-12-07 | End: 2022-12-09 | Stop reason: HOSPADM

## 2022-12-07 RX ORDER — AMOXICILLIN 250 MG
1 CAPSULE ORAL 2 TIMES DAILY PRN
Status: DISCONTINUED | OUTPATIENT
Start: 2022-12-07 | End: 2022-12-09 | Stop reason: HOSPADM

## 2022-12-07 RX ORDER — ONDANSETRON 2 MG/ML
4 INJECTION INTRAMUSCULAR; INTRAVENOUS EVERY 6 HOURS PRN
Status: DISCONTINUED | OUTPATIENT
Start: 2022-12-07 | End: 2022-12-09 | Stop reason: HOSPADM

## 2022-12-07 RX ORDER — AMOXICILLIN 250 MG
2 CAPSULE ORAL 2 TIMES DAILY PRN
Status: DISCONTINUED | OUTPATIENT
Start: 2022-12-07 | End: 2022-12-09 | Stop reason: HOSPADM

## 2022-12-07 RX ORDER — METHYLPHENIDATE HYDROCHLORIDE 27 MG/1
27 TABLET ORAL EVERY MORNING
Status: DISCONTINUED | OUTPATIENT
Start: 2022-12-08 | End: 2022-12-09 | Stop reason: HOSPADM

## 2022-12-07 RX ORDER — LISINOPRIL 20 MG/1
20 TABLET ORAL 2 TIMES DAILY
Status: DISCONTINUED | OUTPATIENT
Start: 2022-12-08 | End: 2022-12-09 | Stop reason: HOSPADM

## 2022-12-07 RX ORDER — ATORVASTATIN CALCIUM 20 MG/1
20 TABLET, FILM COATED ORAL DAILY
Status: DISCONTINUED | OUTPATIENT
Start: 2022-12-08 | End: 2022-12-08

## 2022-12-07 RX ORDER — ASPIRIN 81 MG/1
162 TABLET, CHEWABLE ORAL ONCE
Status: COMPLETED | OUTPATIENT
Start: 2022-12-07 | End: 2022-12-07

## 2022-12-07 RX ORDER — BUPROPION HYDROCHLORIDE 150 MG/1
150 TABLET ORAL EVERY MORNING
Status: DISCONTINUED | OUTPATIENT
Start: 2022-12-08 | End: 2022-12-07

## 2022-12-07 RX ORDER — PHENOL 1.4 %
10 AEROSOL, SPRAY (ML) MUCOUS MEMBRANE AT BEDTIME
COMMUNITY

## 2022-12-07 RX ORDER — ASPIRIN 81 MG/1
81 TABLET, CHEWABLE ORAL DAILY
Status: DISCONTINUED | OUTPATIENT
Start: 2022-12-08 | End: 2022-12-09 | Stop reason: HOSPADM

## 2022-12-07 RX ORDER — BUPROPION HYDROCHLORIDE 150 MG/1
450 TABLET ORAL EVERY MORNING
Status: DISCONTINUED | OUTPATIENT
Start: 2022-12-08 | End: 2022-12-09 | Stop reason: HOSPADM

## 2022-12-07 RX ORDER — BUPROPION HYDROCHLORIDE 300 MG/1
300 TABLET ORAL EVERY MORNING
COMMUNITY

## 2022-12-07 RX ORDER — BUPROPION HYDROCHLORIDE 150 MG/1
150 TABLET ORAL EVERY MORNING
COMMUNITY

## 2022-12-07 RX ORDER — METHYLPHENIDATE HYDROCHLORIDE 27 MG/1
27 TABLET ORAL EVERY MORNING
COMMUNITY

## 2022-12-07 RX ORDER — ONDANSETRON 4 MG/1
4 TABLET, ORALLY DISINTEGRATING ORAL EVERY 6 HOURS PRN
Status: DISCONTINUED | OUTPATIENT
Start: 2022-12-07 | End: 2022-12-09 | Stop reason: HOSPADM

## 2022-12-07 RX ORDER — CLONAZEPAM 0.5 MG/1
1-2 TABLET ORAL
Status: DISCONTINUED | OUTPATIENT
Start: 2022-12-07 | End: 2022-12-09 | Stop reason: HOSPADM

## 2022-12-07 RX ORDER — ALBUTEROL SULFATE 90 UG/1
2 AEROSOL, METERED RESPIRATORY (INHALATION) DAILY PRN
Status: DISCONTINUED | OUTPATIENT
Start: 2022-12-07 | End: 2022-12-09 | Stop reason: HOSPADM

## 2022-12-07 RX ORDER — LISINOPRIL 10 MG/1
20 TABLET ORAL 2 TIMES DAILY
Status: DISCONTINUED | OUTPATIENT
Start: 2022-12-07 | End: 2022-12-07

## 2022-12-07 RX ORDER — LIDOCAINE 40 MG/G
CREAM TOPICAL
Status: DISCONTINUED | OUTPATIENT
Start: 2022-12-07 | End: 2022-12-09 | Stop reason: HOSPADM

## 2022-12-07 RX ORDER — HYDROCHLOROTHIAZIDE 25 MG/1
25 TABLET ORAL EVERY MORNING
Status: DISCONTINUED | OUTPATIENT
Start: 2022-12-08 | End: 2022-12-08

## 2022-12-07 RX ADMIN — CLONAZEPAM 2 MG: 0.5 TABLET ORAL at 20:41

## 2022-12-07 RX ADMIN — ASPIRIN 81 MG CHEWABLE TABLET 162 MG: 81 TABLET CHEWABLE at 10:59

## 2022-12-07 RX ADMIN — HUMAN ALBUMIN MICROSPHERES AND PERFLUTREN 4 ML: 10; .22 INJECTION, SOLUTION INTRAVENOUS at 13:22

## 2022-12-07 RX ADMIN — METOPROLOL TARTRATE 12.5 MG: 25 TABLET, FILM COATED ORAL at 20:41

## 2022-12-07 RX ADMIN — FLUTICASONE FUROATE AND VILANTEROL TRIFENATATE 1 PUFF: 100; 25 POWDER RESPIRATORY (INHALATION) at 18:54

## 2022-12-07 RX ADMIN — Medication 10 MG: at 23:18

## 2022-12-07 ASSESSMENT — ACTIVITIES OF DAILY LIVING (ADL)
ADLS_ACUITY_SCORE: 35
ADLS_ACUITY_SCORE: 21
ADLS_ACUITY_SCORE: 35
ADLS_ACUITY_SCORE: 35
FALL_HISTORY_WITHIN_LAST_SIX_MONTHS: NO
WALKING_OR_CLIMBING_STAIRS_DIFFICULTY: NO
DOING_ERRANDS_INDEPENDENTLY_DIFFICULTY: YES
ADLS_ACUITY_SCORE: 35
DRESSING/BATHING_DIFFICULTY: NO
ADLS_ACUITY_SCORE: 35
WEAR_GLASSES_OR_BLIND: NO
ADLS_ACUITY_SCORE: 35
TOILETING_ISSUES: NO
DIFFICULTY_EATING/SWALLOWING: NO
CHANGE_IN_FUNCTIONAL_STATUS_SINCE_ONSET_OF_CURRENT_ILLNESS/INJURY: NO
CONCENTRATING,_REMEMBERING_OR_MAKING_DECISIONS_DIFFICULTY: YES

## 2022-12-07 ASSESSMENT — ENCOUNTER SYMPTOMS
WEAKNESS: 1
LIGHT-HEADEDNESS: 1
SHORTNESS OF BREATH: 1
FATIGUE: 1
NAUSEA: 1

## 2022-12-07 NOTE — H&P
[facility]    History and Physical - Hospitalist Service       Date of Admission:  12/7/2022    Assessment & Plan      Nazario Truong is a 51 year old male admitted on 12/7 with a past medical history of hypertension, hyperlipidemia, basal cell carcinoma, moderate coronary artery calcifications on CT, asthma, ADHD who presents with intermittent chest pain for the last week.  Last week while shoveling snow he started to experience increasing shortness of breath, diaphoresis and became dizzy.  He sat down and symptoms resolved within 1 to 2 hours.  Later that same evening he developed sharp left-sided chest pain that would radiate into the left shoulder and arm.  He also experienced some sternal chest tightness.  Since his episode he has been experiencing chest pain several times a day mostly with exertion and shortness of breath.  His symptoms always improve with rest.  States his mother and father both have coronary artery disease with stent placement.    In the ED troponin and D-dimer were negative.  Echo exercise stress test was performed that showed that showed 0.5 to 1 mm flat ST depression in the precordial leads at peak exercise, patient was symptomatic with chest pain and shortness of breath, Madden treadmill score was in intermediate suggestive of intermediate probability of obstructive coronary artery disease, small area of basal lateral ischemia cannot be ruled out on stress imaging.  ED provider spoke with cardiology who is recommending a coronary angiogram tomorrow given his presentation, risk factors, and family history.  Repeat troponin is pending.       Unstable angina   Moderate coronary artery calcifications on CT 12/01  Chest pain is concerning for unstable angina. Echo stress test suggestive of intermediate probability of obstructive coronary artery disease. Cardiology consulted by the ED provider who is recommending a coronary cath tomorrow. Not recommending heparin at  "this time as he has no chest pain or shortness of breath.  - Repeat second troponin was < 6  - Start heparin if patient develops prolonged chest pain  - received aspirin in ED  - subinguinal nitroglycerin available PRN, after 2 doses contact provider   - Coronary cath planned for tomorrow, NPO at midnight   - hold hydrochlorothiazide, concerta, and Wellbutrin until after the procedure  - Start metoprolol 12.5 Bid due to already fairly well controlled BP   - continue daily aspirin    Hypertension  - Continue PTA lisinopril  - Hold hydrochlorothiazide for procedure tomorrow  - Start metoprolol     Hyperlipidemia  - continue pta atorvastatin, will discuss with cardiology if should increase to high intensity statin    Mild hyponatremia   -Suspect likely due to dehydration  -Repeat BMP in a.m.       Diet:  Regular diet, NPO at midnight  DVT Prophylaxis: Low Risk/Ambulatory 3 times per day, pneumatic compression devices  Warner Catheter: Not present  Central Lines: None  Cardiac Monitoring: None  Code Status:   Full code    Clinically Significant Risk Factors Present on Admission         # Hyponatremia: Lowest Na = 133 mmol/L (Ref range: 136-145) in last 2 days, will monitor as appropriate          # Hypertension: home medication list includes antihypertensive(s)     # Overweight: Estimated body mass index is 25.09 kg/m  as calculated from the following:    Height as of this encounter: 1.727 m (5' 8\").    Weight as of this encounter: 74.8 kg (165 lb).           Disposition Plan likely tomorrow but will depend on coronary angiogram.     The patient's care was discussed with the Patient.    CARLOS WHITE PA-C  Hospitalist Service  [facility]  Securely message with the Vocera Web Console (learn more here)  Text page via [employer] Paging/Directory   ______________________________________________________    Chief Complaint   Intermittent chest pain    History is obtained from the patient    History of " Present Illness   Nazario Truong is a 51 year old male with a past medical history of hypertension, hyperlipidemia, basal cell carcinoma, moderate coronary artery calcifications on CT, asthma, ADHD who presents with intermittent chest pain for the last week.  Last week while shoveling snow he started to experience increasing shortness of breath, diaphoresis and became dizzy.  He sat down and symptoms resolved within 1 to 2 hours.  Later that same evening he developed sharp left-sided chest pain that would radiate into the left shoulder and arm.  He also experienced some sternal chest tightness.  Since his episode he has been experiencing chest pain several times a day mostly with exertion and shortness of breath.  His symptoms always improve with rest.  States his mother and father both have coronary artery disease with stent placement.  He is currently not experiencing chest pain or shortness of breath.    In the ED, afebrile, blood pressure 128/82, heart rate 79, and oxygen 96%. EKG was sinus rhythm with age undetermined septal infarct. Initial troponin 6. CXR shows small nodular opacity in the right upper lobe, may correlate with small nodule seen on the recent chest CT. No infiltrate, pleural effusion or pneumothorax. BMP notable for mild hyponatremia 133, glucose 102. CBC unremarkable. D-dimer negative 0.28. Echo stress test  Showed: patient experienced shortness of breath, lightheadedness and chest pain with exercise.  There was 0.5 to 1 mm flat ST depression in the precordial leads with peak exercise. Madden treadmill score was intermediate suggestive of intermediate probability of obstructive coronary artery disease. Grossly normal wall motion at rest and with exercise. Small area of basal lateral ischemia cannot be ruled out on stress imaging. Cardiology was consulted in ED by provider who is recommending coronary cath.     Review of Systems    The 10 point Review of Systems is negative other than noted in  the hospitals.    Past Medical History    I have reviewed this patient's medical history and updated it with pertinent information if needed.   Past Medical History:   Diagnosis Date     ADD (attention deficit disorder)     formal evaluation ; managed through [facility]     Anxiety      HTN (hypertension)      Hyperlipidemia LDL goal < 130      Lumbar disc herniation     after lifting injury, 9 months of PT       Past Surgical History   I have reviewed this patient's surgical history and updated it with pertinent information if needed.  Past Surgical History:   Procedure Laterality Date     HEAD & NECK SURGERY      parathyroid surgery     LAPAROSCOPIC APPENDECTOMY N/A 7/10/2016    Procedure: LAPAROSCOPIC APPENDECTOMY;  Surgeon: Jorge Schaffer MD;  Location:  OR       Social History   I have reviewed this patient's social history and updated it with pertinent information if needed.  Social History     Tobacco Use     Smoking status: Former     Years: 25.00     Types: Cigarettes     Quit date: 2010     Years since quittin.9   Substance Use Topics     Alcohol use: Yes     Comment: 1-2 drinks      Drug use: No       Family History   I have reviewed this patient's family history and updated it with pertinent information if needed.  Family History   Problem Relation Age of Onset     Hypertension Mother      Lipids Mother      Hypertension Father      Lipids Father      C.A.D. Mother         stents in age 50's     C.A.D. Father         stent age 50       Prior to Admission Medications   Prior to Admission Medications   Prescriptions Last Dose Informant Patient Reported? Taking?   CLONAZEPAM PO  Pharmacy Yes No   Sig: Take 1-2 mg by mouth nightly as needed for anxiety or other (insomnia)   Nicotine Polacrilex (NICORETTE MT)   Yes No   Sig: Take 2 mg by mouth every hour as needed for smoking cessation   albuterol (PROAIR RESPICLICK) 108 (90 Base) MCG/ACT inhaler   Yes No   Sig: Inhale 2 puffs into  the lungs   amphetamine-dextroamphetamine (ADDERALL) 15 MG tablet   Yes No   Sig: Take 1 tablet by mouth. take twice daily.  prescribed by Dr. Shabazz.   aspirin (ASA) 81 MG chewable tablet   Yes No   Sig: Take 81 mg by mouth   atorvastatin (LIPITOR) 20 MG tablet   No No   Sig: Take 1 tablet (20 mg) by mouth daily   benzonatate (TESSALON) 100 MG capsule   No No   Sig: Take 1 capsule (100 mg) by mouth 3 times daily as needed for cough   buPROPion (WELLBUTRIN XL) 300 MG 24 hr tablet   Yes No   Sig: TK 1 T PO D   fluticasone-vilanterol (BREO ELLIPTA) 100-25 MCG/INH inhaler   Yes No   Sig: Inhale 1 puff into the lungs   guaiFENesin-codeine (ROBITUSSIN AC) 100-10 MG/5ML solution   No No   Sig: Take 5-10 mLs by mouth every 6 hours as needed for cough   hydrochlorothiazide (HYDRODIURIL) 25 MG tablet   No No   Sig: Take 1 tablet (25 mg) by mouth every morning   lisinopril (PRINIVIL,ZESTRIL) 20 MG tablet   No No   Sig: Take 1 tablet (20 mg) by mouth 2 times daily   metFORMIN (GLUCOPHAGE) 500 MG tablet   Yes No   Sig: Take 500 mg by mouth   methylPREDNISolone (MEDROL DOSEPAK) 4 MG tablet therapy pack   No No   Sig: Take as directed on the therapy pack      Facility-Administered Medications: None     Allergies   Allergies   Allergen Reactions     Pollen Extract        Physical Exam   Vital Signs: Temp: 97.5  F (36.4  C) Temp src: Temporal BP: 128/82 Pulse: 79   Resp: 18 SpO2: 96 % O2 Device: None (Room air)    Weight: 165 lbs 0 oz    GENERAL:  Alert, Comfortable, No acute distress.  PSYCH: pleasant, oriented.  HEENT:  PERRLA. Normal conjunctiva, normal hearing  HEART:  Normal S1, S2 with no murmur, RRR  LUNGS:  Normal Respiratory effort. Clear to auscultation bilaterally with no wheezing, rales or ronchi.  ABDOMEN:  Soft, non-tender, non distended.   EXTREMITIES:  No pedal edema, No cyanosis.   SKIN:  Warm, dry to touch.   NEUROLOGIC: Speech clear, alert and orientated x4, no focal deficits, moving all extremities      Data    Data reviewed today: I reviewed all medications, new labs and imaging results over the last 24 hours. I personally reviewed the EKG tracing showing Normal sinus rhythm with age-indeterminate septal infarct. Echo stress test results below.     Recent Labs   Lab 22  0932   WBC 4.5   HGB 13.9   MCV 93      *   POTASSIUM 3.8   CHLORIDE 96*   CO2 27   BUN 15.4   CR 0.73   ANIONGAP 10   TERESA 9.6   *      D-dimer 0.28       Recent Results (from the past 24 hour(s))   Chest XR,  PA & LAT    Narrative    XR CHEST 2 VIEWS 2022 11:26 AM    HISTORY: chest pain    COMPARISON: 2022      Impression    IMPRESSION: Small nodular opacity in the right upper lobe, may  correlate with small nodule seen on the recent chest CT. No  infiltrate, pleural effusion or pneumothorax. The cardiac and  mediastinal silhouettes are normal.    ERAN WILKES MD         SYSTEM ID:  [id number]   Echo Stress Echocardiogram    Narrative    [id number]  [id number]  [id number]  [id number]^BRAD^VIOLETA^MARIA DOLORES     [facility]  Echocardiography Laboratory  [address] [place] [address]     Name: MAYTE RESENDIZ  MRN: [id number]  : 1970  Study Date: 2022 01:18 PM  Age: 51 yrs  Gender: Male  Patient Location: [facility]  Reason For Study: Chest Pain  History: Family History  Ordering Physician: VIOLETA SALINAS  Referring Physician: AMANDA CONTI  Performed By: Rebecca Young RDCS     BSA: 1.9 m2  Height: 68 in  Weight: 165 lb  HR: 71  BP: 140/80 mmHg  ______________________________________________________________________________  Procedure  Stress Echo Complete. Contrast Optison.  ______________________________________________________________________________  Interpretation Summary  Patient exercised for total of 7 METS. Patient experienced shortness of breath  lightheadedness and chest pain with exercise.  There was 0.5 to 1 mm flat ST depression in the precordial leads with peak  exercise.  Duke treadmill score  "was intermediate suggestive of intermediate probability  of obstructive coronary artery disease.  Grossly normal wall motion at rest and with exercise. Small area of basal  lateral ischemia cannot be ruled out on stress imaging.  ______________________________________________________________________________  Stress  The patient exercised 4:55.  Exercise was stopped due to fatigue.  The patient exhibited no chest pain during exercise.  There was a normal BP response to exercise.  RPP 21,900.  Target Heart Rate was achieved.  A low workload was achieved.  The Duke treadmill score was intermediate risk ( -11< Madden score <5).     Stress Results             Protocol:  Deven          Maximum Predicted HR:   168 bpm             Target HR: 143 bpm        % Maximum Predicted HR: 87 %       Stage  DurationHeart Rate  BP                     Comment            (mm:ss)   (bpm)   Stage 1   3:00      125   142/80   Stage 2   1:55      146   150/80Lightheadedness, SOB, \"Chest Tightness\" 3/10  RecoveryR  6:00      90    130/80Symptoms resolved in recovery            Stress Duration:   4:55 mm:ss *        Recovery Time: 6:00 mm:ss         Maximum Stress HR: 146 bpm *           METS:          7     Left Ventricle  The left ventricle is normal in size. There is normal left ventricular wall  thickness. Left ventricular systolic function is normal.     Right Ventricle  The right ventricle is normal in size and function.     Aortic Valve  No hemodynamically significant valvular aortic stenosis.     Pericardium  There is no pericardial effusion.  ______________________________________________________________________________  Report approved by: Denise Matamoros 12/07/2022 02:03 PM     ______________________________________________________________________________        "

## 2022-12-07 NOTE — ED PROVIDER NOTES
History   Chief Complaint:  Chest Pain       The history is provided by the patient.      Nazario Truong is a 51 year old male with history of hypertension, and hyperlipidemia who presents with chest pain. Patient reports exeriencing exertional, mid sternal chest pain which radiates down his left arm, since last week. He rates the pain 7/10 and endorses associated shortness of breath, diaphoresis, lightheadedness, and nausea.  He was so short of breath that he was speaking in 1-2 word sentences.  It took several hours for improvement.  He also reports a 6 month history of increasing weakness and fatigue. He has a history of basal cell carcinoma and feels as thought there is something wrong in his lungs and therefore had a CT scan done on 22, see results below. He states that he is anxious about his symptoms. He denies history of heart failure, hearts condition, or leg swelling.  History of basal cell carcinoma status post resection.  He has a grandfather who  in his late 40s from MI.  He has a 20-pack-year history of tobacco use but quit 12 years ago.  No history of DVT/PE, hemoptysis, current smoking, hormone use, lower extremity symptoms, recent immobilizations.      CT Chest Lung Cancer Screen Low Dose w/o Contrast at [facility] on 22  Impression:  1. ACR Assessment Category:  Lung-RADS Category 3. Probably benign  finding(s)- short term follow up suggested with 6 month low dose CT  (please order exam code IMG 2163).  2. Significant Incidental Finding(s):  Category S: Yes. Moderate  coronary artery calcification.       Review of Systems   Constitutional: Positive for fatigue.   Respiratory: Positive for shortness of breath.    Cardiovascular: Positive for chest pain. Negative for leg swelling.   Gastrointestinal: Positive for nausea.   Neurological: Positive for weakness and light-headedness.   All other systems reviewed and are negative.    Allergies:  No known drug  "allergies     Medications:  Clonazepam   Bupropion   Aspirin 81mg   Epinephrine   Rivatio    Hydrochlorothiazide   Lisinopril   Metformin   Atorvastatin   Fluticasone propion- salmeterol      Past Medical History:     Hypertension   Hyperlipidemia  ADD  Lumbar disc herniation  Anxiety  ANA ROSA  Appendicitis with peritonitis  Adenomatous colon polyp   Depression   Elevated coronary artery calcium score   Asthma   Vitamin D deficiency   Pulmonary nodule   Chronic back pain   Onychomycosis    Bipolar disorder   Anterior uveitis   Hyperglycemia   Acute and subacute iridocyclitis      Past Surgical History:    Parathyroidectomy   Appendectomy  Tonsillectomy  Vasectomy  Hemorrhoidectomy      Family History:    Mother- hypertension, lipids, CAD, skin cancer  Father- hypertension, lipids, CAD, kidney disease, skin cancer     Social History:  Presents via private vehicle   Presents alone   PCP: Dorys Zafar     Physical Exam     Patient Vitals for the past 24 hrs:   BP Temp Temp src Pulse Resp SpO2 Height Weight   12/07/22 1530 (!) 139/96 -- -- 85 -- -- -- --   12/07/22 1515 -- -- -- 82 17 96 % -- --   12/07/22 1500 (!) 153/86 -- -- 90 26 97 % -- --   12/07/22 1430 128/82 -- -- 79 18 96 % -- --   12/07/22 1400 138/76 -- -- 92 28 97 % -- --   12/07/22 1231 135/73 -- -- 71 -- 98 % -- --   12/07/22 1201 123/74 -- -- 71 -- 97 % -- --   12/07/22 1100 135/85 -- -- 80 18 99 % -- --   12/07/22 0929 139/86 97.5  F (36.4  C) Temporal 84 20 100 % 1.727 m (5' 8\") 74.8 kg (165 lb)       Physical Exam  General: Well-nourished, appears to be resting comfortably when I enter the room  Eyes: PERRL, conjunctivae pink no scleral icterus or conjunctival injection  ENT:  Moist mucus membranes, posterior oropharynx clear without erythema or exudates  Respiratory:  Lungs clear to auscultation bilaterally, no crackles/rubs/wheezes.  Good air movement  CV: Normal rate and rhythm, no murmurs/rubs/gallops  GI:  Abdomen soft and non-distended.  " Normoactive BS.  No tenderness, guarding or rebound  Skin: Warm, dry.  No rashes or petechiae  Musculoskeletal: No peripheral edema or calf tenderness  Neuro: Alert and oriented to person/place/time  Psychiatric: Normal affect    Emergency Department Course   ECG  ECG taken at 0925, ECG read at 0927  Normal sinus rhythm   Septal infarct, age undetermined   Abnormal ECG    Rate 84 bpm. KY interval 174 ms. QRS duration 104 ms. QT/QTc 378/446 ms. P-R-T axes 64 86 57.     Imaging:  Echo Stress Echocardiogram   Final Result      Chest XR,  PA & LAT   Final Result   IMPRESSION: Small nodular opacity in the right upper lobe, may   correlate with small nodule seen on the recent chest CT. No   infiltrate, pleural effusion or pneumothorax. The cardiac and   mediastinal silhouettes are normal.      ERAN WILKES MD            SYSTEM ID:  [id number]        Report per radiology    Laboratory:  Labs Ordered and Resulted from Time of ED Arrival to Time of ED Departure   BASIC METABOLIC PANEL - Abnormal       Result Value    Sodium 133 (*)     Potassium 3.8      Chloride 96 (*)     Carbon Dioxide (CO2) 27      Anion Gap 10      Urea Nitrogen 15.4      Creatinine 0.73      Calcium 9.6      Glucose 102 (*)     GFR Estimate >90     CBC WITH PLATELETS - Normal    WBC Count 4.5      RBC Count 4.45      Hemoglobin 13.9      Hematocrit 41.4      MCV 93      MCH 31.2      MCHC 33.6      RDW 12.2      Platelet Count 234     TROPONIN T, HIGH SENSITIVITY - Normal    Troponin T, High Sensitivity 6     NT PROBNP INPATIENT - Normal    N terminal Pro BNP Inpatient 13     D DIMER QUANTITATIVE - Normal    D-Dimer Quantitative 0.28     TROPONIN T, HIGH SENSITIVITY - Normal    Troponin T, High Sensitivity <6          Emergency Department Course:     Reviewed:  I reviewed nursing notes, vitals, past medical history and Care Everywhere    Assessments:  1005 I obtained history and examined the patient as noted above.    I rechecked the patient and  explained findings.     Consults:  8305 I spoke with Dr. Myers, cardiology, regarding the patient.   I spoke with JUDE Geronimo for Dr. Self of the hospitalist service.     Interventions:  1059 Aspirin 162mg PO   1322 Optison 4mL IV   1323 PF 10mL IV     Disposition:  The patient was admitted to the hospital under the care of Dr. Oropeza.     Impression & Plan     HEART Score  Background  Calculates the overall risk of adverse event in patient's presenting with chest pain.  Based on 5 criteria (each assigned 0-2 points) including suspiciousness of history, EKG, age, risk factors and troponin.    Data  51 year old male  has Benign essential hypertension; Hyperlipidemia with target LDL less than 130; ADD (attention deficit disorder); Lumbar disc herniation; Anxiety; and Appendicitis with peritonitis on their problem list.   reports that he quit smoking about 12 years ago. His smoking use included cigarettes. He does not have any smokeless tobacco history on file.  family history includes C.A.D. in his father and mother; Hypertension in his father and mother; Lipids in his father and mother.  No results found for: TROPI  Criteria   0-2 points for each of 5 items (maximum of 10 points):  Score 2- History very suspicious for coronary syndrome  Score 0- EKG Normal  Score 1- Age 45 to 65 years old  Score 2- Three or more risk factors for or history of atherosclerotic disease  Score 0- Within normal limits for troponin levels  Interpretation  Risk of adverse outcome  Heart Score: 5  Total Score 4-6- Adverse Outcome Risk 20.3% - Supports admission with standard rule-out management -serial troponins and stress testing    Medical Decision Making:  Nazario Truong is a 51 year old male who comes with a history that is very concerning for angina.  He has significant cardiac risk factors including hypertension, diabetes and smoking.  Additionally, a recent CT scan of his chest incidentally showed moderate coronary  artery calcification.  He is not having pain right now.  His EKG showed no acute ischemic changes.  His troponin was negative.  D-dimer was negative and his chest x-ray today shows no pneumothorax, pneumonia or other abnormality.  I was able to get him in for a stress test today.  The stress test is intermediate.  Given his risk factors and the stress test results, discussed with cardiology who recommended admission with the plan for likely angiogram tomorrow.  I think this is the safest course of action for the patient.  He was updated and agreed with the plan.  The hospital service graciously agreed to admit the patient.    Diagnosis:    ICD-10-CM    1. Solitary pulmonary nodule  R91.1 CANCELED: CT Chest w/o Contrast     CANCELED: CT Chest w/o Contrast      2. Exertional chest pain  R07.9       3. Abnormal stress echo  R94.39           Discharge Medications:  New Prescriptions    No medications on file       Scribe Disclosure:  SLIM Jaylyn Shane, am serving as a scribe at 9:57 AM on 12/7/2022 to document services personally performed by Debra Morris MD based on my observations and the provider's statements to me.          Debra Morris MD  12/07/22 5717

## 2022-12-07 NOTE — PHARMACY-ADMISSION MEDICATION HISTORY
Admission medication history interview status for this patient is complete. See [facility] admission navigator for allergy information, prior to admission medications and immunization status.     Medication history interview done, indicate source(s): Patient  Medication history resources (including written lists, pill bottles, clinic record):[facility] list  Pharmacy: [facility]    Changes made to PTA medication list:  Added: melatonin, concerta  Changed: wellbutrin-XL, lisinopril  Reported as Not Taking: None  Removed: adderall, robitussin-AC, medrol dose pack, nicotine polacrilex, tessalon    Actions taken by pharmacist (provider contacted, etc):None     Additional medication history information:None    Medication reconciliation/reorder completed by provider prior to medication history?  N   (Y/N)     Prior to Admission medications    Medication Sig Last Dose Taking? Auth Provider Long Term End Date   albuterol (PROAIR RESPICLICK) 108 (90 Base) MCG/ACT inhaler Inhale 2 puffs into the lungs Past Week Yes Reported, Patient No    aspirin (ASA) 81 MG chewable tablet Take 81 mg by mouth daily 12/7/2022 Yes Reported, Patient     atorvastatin (LIPITOR) 20 MG tablet Take 1 tablet (20 mg) by mouth daily 12/7/2022 Yes Laureano Young MD Yes    buPROPion (WELLBUTRIN XL) 150 MG 24 hr tablet Take 150 mg by mouth every morning Take with 300mg = 450mg daily 12/7/2022 Yes Unknown, Entered By History No    buPROPion (WELLBUTRIN XL) 300 MG 24 hr tablet Take 300 mg by mouth every morning Take with 150mg= total of 450mg daily 12/7/2022 Yes Unknown, Entered By History No    CLONAZEPAM PO Take 1-2 mg by mouth nightly as needed for anxiety or other (insomnia) 12/6/2022 Yes Unknown, Entered By History No    fluticasone-vilanterol (BREO ELLIPTA) 100-25 MCG/INH inhaler Inhale 1 puff into the lungs daily 12/7/2022 Yes Reported, Patient     hydrochlorothiazide (HYDRODIURIL) 25 MG tablet Take 1 tablet (25 mg) by mouth every morning 12/7/2022 Yes  Laureano Young MD Yes    lisinopril (PRINIVIL,ZESTRIL) 20 MG tablet Take 1 tablet (20 mg) by mouth 2 times daily  Patient taking differently: Take 40 mg by mouth daily 12/6/2022 Yes Laureano Young MD Yes    Melatonin 10 MG TABS tablet Take 10 mg by mouth At Bedtime Uses first, then clonazepam if needed 12/6/2022 Yes Unknown, Entered By History     metFORMIN (GLUCOPHAGE) 500 MG tablet Take 500 mg by mouth daily (with dinner) 12/6/2022 Yes Reported, Patient Yes 12/7/22   methylphenidate (CONCERTA) 27 MG CR tablet Take 27 mg by mouth every morning 12/7/2022 Yes Unknown, Entered By History

## 2022-12-07 NOTE — ED TRIAGE NOTES
Pt c/o chest pain, initially a week ago while shoveling, that resolved then again yesterday. Pt endorses some shortness of breath, mostly associated with exertion. Pain is mid sternal and radiates down left arm. Pt also had a CT last Thursday for evaluation to R/O lung cancer.      Triage Assessment     Row Name 12/07/22 0931       Triage Assessment (Adult)    Airway WDL WDL       Respiratory WDL    Respiratory WDL WDL       Cardiac WDL    Cardiac WDL chest pain       Chest Pain Assessment    Chest Pain Location midsternal    Character pressure;tightness    Precipitating Factors physical exertion    Alleviating Factors rest    Associated Signs/Symptoms dyspnea       Peripheral/Neurovascular WDL    Peripheral Neurovascular WDL WDL       Cognitive/Neuro/Behavioral WDL    Cognitive/Neuro/Behavioral WDL WDL

## 2022-12-07 NOTE — ED NOTES
[facility]  ED Nurse Handoff Report    Nazario Truong is a 51 year old male   ED Chief complaint: Chest Pain  . ED Diagnosis:   Final diagnoses:   Exertional chest pain   Abnormal stress echo     Allergies:   Allergies   Allergen Reactions     Pollen Extract        Code Status: Full Code  Activity level - Baseline/Home:  Independent. Activity Level - Current:   Independent. Lift room needed: No. Bariatric: No   Needed: No   Isolation: No. Infection: Not Applicable.     Vital Signs:   Vitals:    22 1201 22 1231 22 1400 22 1430   BP: 123/74 135/73 138/76 128/82   Pulse: 71 71 92 79   Resp:   28 18   Temp:       TempSrc:       SpO2: 97% 98% 97% 96%   Weight:       Height:           Cardiac Rhythm:  ,      Pain level:    Patient confused: No. Patient Falls Risk: No.   Elimination Status: Has voided   Patient Report - Initial Complaint: chest pain. Focused Assessment:    Tests Performed: imaging, labs. Abnormal Results: Nazario Truong is a 51 year old male with history of hypertension, and hyperlipidemia who presents with chest pain. Patient reports exeriencing exertional, mid sternal chest pain which radiates down his left arm, since last week. He rates the pain 7/10 and endorses associated shortness of breath, diaphoresis, lightheadedness, and nausea.  He was so short of breath that he was speaking in 1-2 word sentences.  It took several hours for improvement.  He also reports a 6 month history of increasing weakness and fatigue. He has a history of basal cell carcinoma and feels as thought there is something wrong in his lungs and therefore had a CT scan done on 22, see results below. He states that he is anxious about his symptoms. He denies history of heart failure, hearts condition, or leg swelling.  History of basal cell carcinoma status post resection.  He has a grandfather who  in his late 40s from MI.  He has a 20-pack-year history of tobacco use  but quit 12 years ago.  No history of DVT/PE, hemoptysis, current smoking, hormone use, lower extremity symptoms, recent immobilizations..   Treatments provided: aspirin  Family Comments: NA  OBS brochure/video discussed/provided to patient:  N/A  ED Medications:   Medications   aspirin (ASA) chewable tablet 162 mg (162 mg Oral Given 12/7/22 1059)   perflutren diluted 1mL to 2mL with saline (OPTISON) diluted injection 4 mL (4 mLs Intravenous Given 12/7/22 1322)   sodium chloride (PF) 0.9% PF flush 10 mL (10 mLs Intravenous Given 12/7/22 1323)     Drips infusing:  No  For the majority of the shift, the patient's behavior Green. Interventions performed were NA.    Sepsis treatment initiated: No     Patient tested for COVID 19 prior to admission: YES    ED Nurse Name/Phone Number: Roxane Livingston RN,   2:41 PM      RECEIVING UNIT ED HANDOFF REVIEW    Above ED Nurse Handoff Report was reviewed: Yes  Reviewed by: Sulema Buchanan RN on December 7, 2022 at 9:45 PM

## 2022-12-08 ENCOUNTER — APPOINTMENT (OUTPATIENT)
Dept: CARDIOLOGY | Facility: CLINIC | Age: 52
End: 2022-12-08
Payer: COMMERCIAL

## 2022-12-08 LAB
ANION GAP SERPL CALCULATED.3IONS-SCNC: 10 MMOL/L (ref 7–15)
ATRIAL RATE - MUSE: 84 BPM
BUN SERPL-MCNC: 11.8 MG/DL (ref 6–20)
CALCIUM SERPL-MCNC: 9.5 MG/DL (ref 8.6–10)
CHLORIDE SERPL-SCNC: 100 MMOL/L (ref 98–107)
CREAT SERPL-MCNC: 0.83 MG/DL (ref 0.67–1.17)
DEPRECATED HCO3 PLAS-SCNC: 25 MMOL/L (ref 22–29)
DIASTOLIC BLOOD PRESSURE - MUSE: NORMAL MMHG
ERYTHROCYTE [DISTWIDTH] IN BLOOD BY AUTOMATED COUNT: 12.2 % (ref 10–15)
GFR SERPL CREATININE-BSD FRML MDRD: >90 ML/MIN/1.73M2
GLUCOSE BLDC GLUCOMTR-MCNC: 124 MG/DL (ref 70–99)
GLUCOSE SERPL-MCNC: 124 MG/DL (ref 70–99)
HBA1C MFR BLD: 5.6 %
HCT VFR BLD AUTO: 43.6 % (ref 40–53)
HGB BLD-MCNC: 14.7 G/DL (ref 13.3–17.7)
INTERPRETATION ECG - MUSE: NORMAL
MCH RBC QN AUTO: 31.3 PG (ref 26.5–33)
MCHC RBC AUTO-ENTMCNC: 33.7 G/DL (ref 31.5–36.5)
MCV RBC AUTO: 93 FL (ref 78–100)
P AXIS - MUSE: 64 DEGREES
PLATELET # BLD AUTO: 230 10E3/UL (ref 150–450)
POTASSIUM SERPL-SCNC: 4.5 MMOL/L (ref 3.4–5.3)
PR INTERVAL - MUSE: 174 MS
QRS DURATION - MUSE: 104 MS
QT - MUSE: 378 MS
QTC - MUSE: 446 MS
R AXIS - MUSE: 86 DEGREES
RBC # BLD AUTO: 4.7 10E6/UL (ref 4.4–5.9)
SODIUM SERPL-SCNC: 135 MMOL/L (ref 136–145)
SYSTOLIC BLOOD PRESSURE - MUSE: NORMAL MMHG
T AXIS - MUSE: 57 DEGREES
VENTRICULAR RATE- MUSE: 84 BPM
WBC # BLD AUTO: 4.3 10E3/UL (ref 4–11)

## 2022-12-08 PROCEDURE — 94640 AIRWAY INHALATION TREATMENT: CPT

## 2022-12-08 PROCEDURE — C1894 INTRO/SHEATH, NON-LASER: HCPCS | Performed by: INTERNAL MEDICINE

## 2022-12-08 PROCEDURE — 80048 BASIC METABOLIC PNL TOTAL CA: CPT

## 2022-12-08 PROCEDURE — 36415 COLL VENOUS BLD VENIPUNCTURE: CPT

## 2022-12-08 PROCEDURE — 93571 IV DOP VEL&/PRESS C FLO 1ST: CPT | Performed by: INTERNAL MEDICINE

## 2022-12-08 PROCEDURE — 93306 TTE W/DOPPLER COMPLETE: CPT

## 2022-12-08 PROCEDURE — G0378 HOSPITAL OBSERVATION PER HR: HCPCS

## 2022-12-08 PROCEDURE — 93454 CORONARY ARTERY ANGIO S&I: CPT | Mod: 26 | Performed by: INTERNAL MEDICINE

## 2022-12-08 PROCEDURE — 258N000003 HC RX IP 258 OP 636: Performed by: INTERNAL MEDICINE

## 2022-12-08 PROCEDURE — 99152 MOD SED SAME PHYS/QHP 5/>YRS: CPT | Performed by: INTERNAL MEDICINE

## 2022-12-08 PROCEDURE — 83036 HEMOGLOBIN GLYCOSYLATED A1C: CPT | Performed by: INTERNAL MEDICINE

## 2022-12-08 PROCEDURE — 250N000013 HC RX MED GY IP 250 OP 250 PS 637

## 2022-12-08 PROCEDURE — 99225 PR SUBSEQUENT OBSERVATION CARE,LEVEL II: CPT | Mod: FS

## 2022-12-08 PROCEDURE — 96360 HYDRATION IV INFUSION INIT: CPT | Mod: XU

## 2022-12-08 PROCEDURE — 250N000009 HC RX 250: Performed by: INTERNAL MEDICINE

## 2022-12-08 PROCEDURE — 85027 COMPLETE CBC AUTOMATED: CPT

## 2022-12-08 PROCEDURE — C1769 GUIDE WIRE: HCPCS | Performed by: INTERNAL MEDICINE

## 2022-12-08 PROCEDURE — 99205 OFFICE O/P NEW HI 60 MIN: CPT | Mod: 25 | Performed by: INTERNAL MEDICINE

## 2022-12-08 PROCEDURE — 250N000011 HC RX IP 250 OP 636: Performed by: INTERNAL MEDICINE

## 2022-12-08 PROCEDURE — 93454 CORONARY ARTERY ANGIO S&I: CPT | Performed by: INTERNAL MEDICINE

## 2022-12-08 PROCEDURE — 93571 IV DOP VEL&/PRESS C FLO 1ST: CPT | Mod: 26 | Performed by: INTERNAL MEDICINE

## 2022-12-08 PROCEDURE — 250N000013 HC RX MED GY IP 250 OP 250 PS 637: Performed by: INTERNAL MEDICINE

## 2022-12-08 PROCEDURE — 96361 HYDRATE IV INFUSION ADD-ON: CPT

## 2022-12-08 PROCEDURE — 93306 TTE W/DOPPLER COMPLETE: CPT | Mod: 26 | Performed by: INTERNAL MEDICINE

## 2022-12-08 PROCEDURE — C1887 CATHETER, GUIDING: HCPCS | Performed by: INTERNAL MEDICINE

## 2022-12-08 PROCEDURE — 272N000001 HC OR GENERAL SUPPLY STERILE: Performed by: INTERNAL MEDICINE

## 2022-12-08 RX ORDER — LORAZEPAM 0.5 MG/1
0.5 TABLET ORAL
Status: DISCONTINUED | OUTPATIENT
Start: 2022-12-08 | End: 2022-12-08 | Stop reason: HOSPADM

## 2022-12-08 RX ORDER — NALOXONE HYDROCHLORIDE 0.4 MG/ML
0.4 INJECTION, SOLUTION INTRAMUSCULAR; INTRAVENOUS; SUBCUTANEOUS
Status: DISCONTINUED | OUTPATIENT
Start: 2022-12-08 | End: 2022-12-08

## 2022-12-08 RX ORDER — FENTANYL CITRATE 50 UG/ML
INJECTION, SOLUTION INTRAMUSCULAR; INTRAVENOUS
Status: DISCONTINUED | OUTPATIENT
Start: 2022-12-08 | End: 2022-12-08 | Stop reason: HOSPADM

## 2022-12-08 RX ORDER — ATROPINE SULFATE 0.1 MG/ML
0.5 INJECTION INTRAVENOUS
Status: ACTIVE | OUTPATIENT
Start: 2022-12-08 | End: 2022-12-09

## 2022-12-08 RX ORDER — FENTANYL CITRATE 50 UG/ML
INJECTION, SOLUTION INTRAMUSCULAR; INTRAVENOUS
Status: DISCONTINUED
Start: 2022-12-08 | End: 2022-12-08 | Stop reason: HOSPADM

## 2022-12-08 RX ORDER — DOPAMINE HYDROCHLORIDE 160 MG/100ML
2-20 INJECTION, SOLUTION INTRAVENOUS CONTINUOUS PRN
Status: DISCONTINUED | OUTPATIENT
Start: 2022-12-08 | End: 2022-12-08 | Stop reason: HOSPADM

## 2022-12-08 RX ORDER — SODIUM CHLORIDE 9 MG/ML
75 INJECTION, SOLUTION INTRAVENOUS CONTINUOUS
Status: ACTIVE | OUTPATIENT
Start: 2022-12-08 | End: 2022-12-08

## 2022-12-08 RX ORDER — POTASSIUM CHLORIDE 1500 MG/1
20 TABLET, EXTENDED RELEASE ORAL
Status: DISCONTINUED | OUTPATIENT
Start: 2022-12-08 | End: 2022-12-08 | Stop reason: HOSPADM

## 2022-12-08 RX ORDER — EPTIFIBATIDE 2 MG/ML
180 INJECTION, SOLUTION INTRAVENOUS EVERY 10 MIN PRN
Status: DISCONTINUED | OUTPATIENT
Start: 2022-12-08 | End: 2022-12-08 | Stop reason: HOSPADM

## 2022-12-08 RX ORDER — NALOXONE HYDROCHLORIDE 0.4 MG/ML
0.2 INJECTION, SOLUTION INTRAMUSCULAR; INTRAVENOUS; SUBCUTANEOUS
Status: DISCONTINUED | OUTPATIENT
Start: 2022-12-08 | End: 2022-12-09 | Stop reason: HOSPADM

## 2022-12-08 RX ORDER — LIDOCAINE HYDROCHLORIDE 10 MG/ML
INJECTION, SOLUTION EPIDURAL; INFILTRATION; INTRACAUDAL; PERINEURAL
Status: DISCONTINUED
Start: 2022-12-08 | End: 2022-12-08 | Stop reason: HOSPADM

## 2022-12-08 RX ORDER — SODIUM CHLORIDE 9 MG/ML
INJECTION, SOLUTION INTRAVENOUS CONTINUOUS
Status: DISCONTINUED | OUTPATIENT
Start: 2022-12-08 | End: 2022-12-08 | Stop reason: HOSPADM

## 2022-12-08 RX ORDER — NALOXONE HYDROCHLORIDE 0.4 MG/ML
0.4 INJECTION, SOLUTION INTRAMUSCULAR; INTRAVENOUS; SUBCUTANEOUS
Status: DISCONTINUED | OUTPATIENT
Start: 2022-12-08 | End: 2022-12-09 | Stop reason: HOSPADM

## 2022-12-08 RX ORDER — ATORVASTATIN CALCIUM 40 MG/1
80 TABLET, FILM COATED ORAL DAILY
Status: DISCONTINUED | OUTPATIENT
Start: 2022-12-08 | End: 2022-12-09 | Stop reason: HOSPADM

## 2022-12-08 RX ORDER — LIDOCAINE 40 MG/G
CREAM TOPICAL
Status: DISCONTINUED | OUTPATIENT
Start: 2022-12-08 | End: 2022-12-08 | Stop reason: HOSPADM

## 2022-12-08 RX ORDER — NITROGLYCERIN 5 MG/ML
VIAL (ML) INTRAVENOUS
Status: DISCONTINUED | OUTPATIENT
Start: 2022-12-08 | End: 2022-12-08 | Stop reason: HOSPADM

## 2022-12-08 RX ORDER — OXYCODONE HYDROCHLORIDE 5 MG/1
10 TABLET ORAL EVERY 4 HOURS PRN
Status: DISCONTINUED | OUTPATIENT
Start: 2022-12-08 | End: 2022-12-09 | Stop reason: HOSPADM

## 2022-12-08 RX ORDER — HEPARIN SODIUM 1000 [USP'U]/ML
INJECTION, SOLUTION INTRAVENOUS; SUBCUTANEOUS
Status: DISCONTINUED | OUTPATIENT
Start: 2022-12-08 | End: 2022-12-08 | Stop reason: HOSPADM

## 2022-12-08 RX ORDER — LORAZEPAM 2 MG/ML
0.5 INJECTION INTRAMUSCULAR
Status: DISCONTINUED | OUTPATIENT
Start: 2022-12-08 | End: 2022-12-08 | Stop reason: HOSPADM

## 2022-12-08 RX ORDER — ASPIRIN 325 MG
325 TABLET ORAL ONCE
Status: COMPLETED | OUTPATIENT
Start: 2022-12-08 | End: 2022-12-08

## 2022-12-08 RX ORDER — VERAPAMIL HYDROCHLORIDE 2.5 MG/ML
INJECTION, SOLUTION INTRAVENOUS
Status: DISCONTINUED | OUTPATIENT
Start: 2022-12-08 | End: 2022-12-08 | Stop reason: HOSPADM

## 2022-12-08 RX ORDER — ASPIRIN 81 MG/1
243 TABLET, CHEWABLE ORAL ONCE
Status: COMPLETED | OUTPATIENT
Start: 2022-12-08 | End: 2022-12-08

## 2022-12-08 RX ORDER — FLUMAZENIL 0.1 MG/ML
0.2 INJECTION, SOLUTION INTRAVENOUS
Status: ACTIVE | OUTPATIENT
Start: 2022-12-08 | End: 2022-12-09

## 2022-12-08 RX ORDER — IOPAMIDOL 755 MG/ML
INJECTION, SOLUTION INTRAVASCULAR
Status: DISCONTINUED | OUTPATIENT
Start: 2022-12-08 | End: 2022-12-08 | Stop reason: HOSPADM

## 2022-12-08 RX ORDER — VERAPAMIL HYDROCHLORIDE 2.5 MG/ML
INJECTION, SOLUTION INTRAVENOUS
Status: DISCONTINUED
Start: 2022-12-08 | End: 2022-12-08 | Stop reason: HOSPADM

## 2022-12-08 RX ORDER — OXYCODONE HYDROCHLORIDE 5 MG/1
5 TABLET ORAL EVERY 4 HOURS PRN
Status: DISCONTINUED | OUTPATIENT
Start: 2022-12-08 | End: 2022-12-09 | Stop reason: HOSPADM

## 2022-12-08 RX ORDER — DOBUTAMINE HYDROCHLORIDE 200 MG/100ML
2-20 INJECTION INTRAVENOUS CONTINUOUS PRN
Status: DISCONTINUED | OUTPATIENT
Start: 2022-12-08 | End: 2022-12-08 | Stop reason: HOSPADM

## 2022-12-08 RX ORDER — NITROGLYCERIN 5 MG/ML
VIAL (ML) INTRAVENOUS
Status: DISCONTINUED
Start: 2022-12-08 | End: 2022-12-08 | Stop reason: HOSPADM

## 2022-12-08 RX ORDER — NALOXONE HYDROCHLORIDE 0.4 MG/ML
0.2 INJECTION, SOLUTION INTRAMUSCULAR; INTRAVENOUS; SUBCUTANEOUS
Status: DISCONTINUED | OUTPATIENT
Start: 2022-12-08 | End: 2022-12-08

## 2022-12-08 RX ORDER — FENTANYL CITRATE 50 UG/ML
25 INJECTION, SOLUTION INTRAMUSCULAR; INTRAVENOUS
Status: DISCONTINUED | OUTPATIENT
Start: 2022-12-08 | End: 2022-12-09 | Stop reason: HOSPADM

## 2022-12-08 RX ORDER — EPTIFIBATIDE 2 MG/ML
2 INJECTION, SOLUTION INTRAVENOUS CONTINUOUS PRN
Status: DISCONTINUED | OUTPATIENT
Start: 2022-12-08 | End: 2022-12-08 | Stop reason: HOSPADM

## 2022-12-08 RX ORDER — HEPARIN SODIUM 1000 [USP'U]/ML
INJECTION, SOLUTION INTRAVENOUS; SUBCUTANEOUS
Status: DISCONTINUED
Start: 2022-12-08 | End: 2022-12-08 | Stop reason: HOSPADM

## 2022-12-08 RX ORDER — ACETAMINOPHEN 325 MG/1
650 TABLET ORAL EVERY 4 HOURS PRN
Status: DISCONTINUED | OUTPATIENT
Start: 2022-12-08 | End: 2022-12-09 | Stop reason: HOSPADM

## 2022-12-08 RX ADMIN — DEXTROSE AND SODIUM CHLORIDE: 5; 900 INJECTION, SOLUTION INTRAVENOUS at 12:14

## 2022-12-08 RX ADMIN — FLUTICASONE FUROATE AND VILANTEROL TRIFENATATE 1 PUFF: 100; 25 POWDER RESPIRATORY (INHALATION) at 09:20

## 2022-12-08 RX ADMIN — LISINOPRIL 20 MG: 20 TABLET ORAL at 10:56

## 2022-12-08 RX ADMIN — CLONAZEPAM 2 MG: 0.5 TABLET ORAL at 20:33

## 2022-12-08 RX ADMIN — LORAZEPAM 0.5 MG: 0.5 TABLET ORAL at 13:25

## 2022-12-08 RX ADMIN — LISINOPRIL 20 MG: 20 TABLET ORAL at 20:30

## 2022-12-08 RX ADMIN — SODIUM CHLORIDE 75 ML/HR: 9 INJECTION, SOLUTION INTRAVENOUS at 17:21

## 2022-12-08 RX ADMIN — METOPROLOL TARTRATE 12.5 MG: 25 TABLET, FILM COATED ORAL at 10:56

## 2022-12-08 RX ADMIN — ASPIRIN 325 MG ORAL TABLET 325 MG: 325 PILL ORAL at 10:56

## 2022-12-08 RX ADMIN — ATORVASTATIN CALCIUM 80 MG: 40 TABLET, FILM COATED ORAL at 10:57

## 2022-12-08 ASSESSMENT — ACTIVITIES OF DAILY LIVING (ADL)
ADLS_ACUITY_SCORE: 21

## 2022-12-08 NOTE — PROGRESS NOTES
JULIANA giving report: Zenia ANDREWS receiving report: Jaylyn      S:  Procedure performed: Coronary Angiogram    B:  Why procedure needed: Chest pain    A:  Assessment of area: Right radial TR band 12 ml of air. On at 1645    R:  Recommendations: Per MD    2 mg Versed and 75 mcg Fentanyl given      Family updated: per MD

## 2022-12-08 NOTE — PROGRESS NOTES
Patient Transfer Information    Patient tolerated transfer: Yes    Patient connected to monitoring equipment on arrival (if yes, what equipment): Yes: Tele monitor, pulse ox monitor    Safety equipment at bedside (if applicable): Yes     Patient connected to wall oxygen on arrival (if applicable): No -N/A    Belongings: Remained in room during procedure    Report received from JULIANA Knutson.  Cath lab (transporter) physically handed patient off to receiving RN: Yes    I have reviewed the Medications, Allergies, Past Medical and Surgical History, and Social History in the Epic system. I have reviewed pending test results and orders. No further questions at this time.      *See flowsheets for vital signs and focused assessment of admission/transfer*

## 2022-12-08 NOTE — CONSULTS
[facility]    Cardiology Consultation     Date of Admission:  12/7/2022    Assessment & Plan   Nazario Truong is a 51 year old male who was admitted on 12/7/2022.    A very pleasant 51 gentleman with the recently diagnosed coronary disease on the basis of moderate coronary calcification on CT chest, with exertional chest discomfort while shoveling snow who had an exercise stress echocardiogram yesterday and had some chest pain and borderline EKG changes although no clear-cut wall motion abnormalities were noted on the stress echo portion basal lateral was not well visualized by the reader and a small area of ischemia could not be excluded in that region.  I had a long discussion patient regarding his clinical presentation.  Some of the symptoms appear atypical for angina but others like at times exertional related chest discomfort like while shoveling snow and also with stress echo yesterday concerning.  Given moderate coronary artery calcification coronary CT angiogram will not be very optimal for stenosis evaluation.  I discussed option rational of coronary angiogram, risk-benefit of coronary angiogram with risk including but not limited risk of stroke, MI, need for PRBC transfusion, emergent bypass surgery were discussed with patient.  He is also complaining of symptoms of intermittent chest discomfort with sounds different than the chest discomfort he had with physical activity.  I recommend increasing Lipitor to 80 mg daily, continue aspirin.  Given no EKG abnormalities and normal high since her troponin no strong reason to initiate heparin at this time.  Patient understands the rational of coronary angiogram, the risk involved and wishes to proceed with it.  No contraindication for long-term dual antiplatelet therapy if needed.    1.  Exertional chest discomfort.  Borderline abnormal stress echocardiogram.  Some resting chest discomfort.  Overall symptoms are concerning for exertional  angina, possible unstable angina although high since her troponin within reference range and no EKG changes.  2.  Newly diagnosed coronary disease on the basis of moderate coronary calcification on CT chest  3.  Dyslipidemia with LDL of 128  4.  Hypertension on lisinopril, well controlled    Recommendations  Continue aspirin  Increase Lipitor to 80 mg daily  Continue lisinopril, low-dose metoprolol  Coronary angiogram with possible revascularization.  Continue to remain NPO.      High complexity     Pepe Myers MD, MD    Primary Care Physician   Dorys Zafar    Reason for Consult   Reason for consult: I was asked by Rosibel Monaco to evaluate this patient for abnormal stress echocardiogram.    History of Present Illness   Nazario Truong is a 51 year old male who presents with chest discomfort.  He had some chest discomfort while shoveling snow a week ago.  He underwent exercise stress echocardiogram yesterday where he had some chest discomfort with physical activity and 0.5 to 1 mm ST depression although no clear-cut wall motion abnormality was noted on stress echocardiogram the reader felt that basal lateral wall ischemia cannot be excluded.  Patient was recently diagnosed with coronary disease on the basis of moderate coronary calcification on CT chest.  He tells me that sometimes he gets intermittent chest discomfort which sounds different than the chest discomfort he had yesterday well he had stress test and also while shoveling snow a week ago.  He had similar some mild chest discomfort earlier this summer 2.  Baseline EKG shows sinus rhythm without any acute ST-T changes.  High sensitive troponin within reference range.  He has dyslipidemia is on 10 mg of Lipitor he has hypertension is on lisinopril.  He quit tobacco 12 years ago prior to that he smoked 20 years.  He does have strong family history of premature coronary disease involving both parents they had coronary disease in their 40s and 50s.   No bleeding issues.  No anticipated surgery.  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    Past Medical History   Past Medical History:   Diagnosis Date     ADD (attention deficit disorder) 2006    formal evaluation 2006; managed through [facility]     Anxiety      HTN (hypertension) 2004     Hyperlipidemia LDL goal < 130 2004     Lumbar disc herniation 2013    after lifting injury, 9 months of PT         Past Surgical History   Past Surgical History:   Procedure Laterality Date     HEAD & NECK SURGERY  2002    parathyroid surgery     LAPAROSCOPIC APPENDECTOMY N/A 7/10/2016    Procedure: LAPAROSCOPIC APPENDECTOMY;  Surgeon: Jorge Schaffer MD;  Location:  OR         Prior to Admission Medications   Prior to Admission Medications   Prescriptions Last Dose Informant Patient Reported? Taking?   CLONAZEPAM PO 12/6/2022 Pharmacy Yes Yes   Sig: Take 1-2 mg by mouth nightly as needed for anxiety or other (insomnia)   Melatonin 10 MG TABS tablet 12/6/2022  Yes Yes   Sig: Take 10 mg by mouth At Bedtime Uses first, then clonazepam if needed   albuterol (PROAIR RESPICLICK) 108 (90 Base) MCG/ACT inhaler Past Week  Yes Yes   Sig: Inhale 2 puffs into the lungs   aspirin (ASA) 81 MG chewable tablet 12/7/2022  Yes Yes   Sig: Take 81 mg by mouth daily   atorvastatin (LIPITOR) 20 MG tablet 12/7/2022  No Yes   Sig: Take 1 tablet (20 mg) by mouth daily   buPROPion (WELLBUTRIN XL) 150 MG 24 hr tablet 12/7/2022  Yes Yes   Sig: Take 150 mg by mouth every morning Take with 300mg = 450mg daily   buPROPion (WELLBUTRIN XL) 300 MG 24 hr tablet 12/7/2022  Yes Yes   Sig: Take 300 mg by mouth every morning Take with 150mg= total of 450mg daily   fluticasone-vilanterol (BREO ELLIPTA) 100-25 MCG/INH inhaler 12/7/2022  Yes Yes   Sig: Inhale 1 puff into the lungs daily   hydrochlorothiazide (HYDRODIURIL) 25 MG tablet 12/7/2022  No Yes   Sig: Take 1 tablet (25 mg) by mouth every morning    lisinopril (PRINIVIL,ZESTRIL) 20 MG tablet 12/6/2022  No Yes   Sig: Take 1 tablet (20 mg) by mouth 2 times daily   Patient taking differently: Take 40 mg by mouth daily   metFORMIN (GLUCOPHAGE) 500 MG tablet 12/6/2022  Yes Yes   Sig: Take 500 mg by mouth daily (with dinner)   methylphenidate (CONCERTA) 27 MG CR tablet 12/7/2022  Yes Yes   Sig: Take 27 mg by mouth every morning      Facility-Administered Medications: None     Current Facility-Administered Medications   Medication Dose Route Frequency     aspirin  81 mg Oral Daily     [START ON 12/9/2022] atorvastatin  80 mg Oral Daily     [Held by provider] buPROPion  450 mg Oral QAM     fluticasone-vilanterol  1 puff Inhalation Daily     [Held by provider] hydrochlorothiazide  25 mg Oral QAM     lisinopril  20 mg Oral BID     metFORMIN  500 mg Oral Daily with supper     [Held by provider] methylphenidate  27 mg Oral QAM     metoprolol tartrate  12.5 mg Oral BID     sodium chloride (PF)  3 mL Intracatheter Q8H     Current Facility-Administered Medications   Medication Last Rate     Allergies   Allergies   Allergen Reactions     Pollen Extract        Social History    reports that he quit smoking about 12 years ago. His smoking use included cigarettes. He does not have any smokeless tobacco history on file. He reports current alcohol use. He reports that he does not use drugs.      Family History   I have reviewed this patient's family history and updated it with pertinent information if needed.  Family History   Problem Relation Age of Onset     Hypertension Mother      Lipids Mother      Hypertension Father      Lipids Father      C.A.D. Mother         stents in age 50's     C.A.D. Father         stent age 50          Review of Systems   A comprehensive review of system was performed and is negative other than that noted in the HPI or here.     Physical Exam   Vital Signs with Ranges  Temp:  [97.6  F (36.4  C)-98.7  F (37.1  C)] 98.5  F (36.9  C)  Pulse:  [67-92]  "76  Resp:  [16-28] 18  BP: (113-153)/(66-96) 117/69  SpO2:  [94 %-100 %] 100 %  Wt Readings from Last 4 Encounters:   12/07/22 74.8 kg (165 lb)   05/24/22 78.1 kg (172 lb 2.9 oz)   02/10/19 83.9 kg (185 lb)   07/09/16 81.6 kg (180 lb)     I/O last 3 completed shifts:  In: 60 [P.O.:60]  Out: -       Vitals: /69 (BP Location: Right arm)   Pulse 76   Temp 98.5  F (36.9  C) (Oral)   Resp 18   Ht 1.727 m (5' 8\")   Wt 74.8 kg (165 lb)   SpO2 100%   BMI 25.09 kg/m      Physical Exam:   General - Alert and oriented to time place and person in no acute distress  Eyes - No scleral icterus  HEENT - Neck supple, moist mucous membranes  Cardiovascular -S1-S2 normal no murmur rub or gallop  Extremities - There is no pitting pedal edema edema  Respiratory -clear to auscultation  Skin - No pallor or cyanosis  Gastrointestinal - Non tender and non distended without rebound or guarding  Psych - Appropriate affect   Neurological - No gross motor neurological focal deficits    No lab results found in last 7 days.    Invalid input(s): TROPONINIES    Recent Labs   Lab 12/08/22  0715 12/07/22  0932   WBC 4.3 4.5   HGB 14.7 13.9   MCV 93 93    234   * 133*   POTASSIUM 4.5 3.8   CHLORIDE 100 96*   CO2 25 27   BUN 11.8 15.4   CR 0.83 0.73   GFRESTIMATED >90 >90   ANIONGAP 10 10   TERESA 9.5 9.6   * 102*     Recent Labs   Lab Test 12/07/22  1543   CHOL 214*   HDL 58   *   TRIG 142     Recent Labs   Lab 12/08/22  0715 12/07/22  0932   WBC 4.3 4.5   HGB 14.7 13.9   HCT 43.6 41.4   MCV 93 93    234     No results for input(s): PH, PHV, PO2, PO2V, SAT, PCO2, PCO2V, HCO3, HCO3V in the last 168 hours.  Recent Labs   Lab 12/07/22  0932   NTBNPI 13     Recent Labs   Lab 12/07/22  0932   DD 0.28     No results for input(s): SED, CRP in the last 168 hours.  Recent Labs   Lab 12/08/22  0715 12/07/22 0932    234     No results for input(s): TSH in the last 168 hours.  No results for input(s): COLOR, " APPEARANCE, URINEGLC, URINEBILI, URINEKETONE, SG, UBLD, URINEPH, PROTEIN, UROBILINOGEN, NITRITE, LEUKEST, RBCU, WBCU in the last 168 hours.    Imaging:  Recent Results (from the past 48 hour(s))   Chest XR,  PA & LAT    Narrative    XR CHEST 2 VIEWS 2022 11:26 AM    HISTORY: chest pain    COMPARISON: 2022      Impression    IMPRESSION: Small nodular opacity in the right upper lobe, may  correlate with small nodule seen on the recent chest CT. No  infiltrate, pleural effusion or pneumothorax. The cardiac and  mediastinal silhouettes are normal.    ERAN WILKES MD         SYSTEM ID:  [id number]   Echo Stress Echocardiogram    Narrative    [id number]  [id number]  [id number]  [id number]^BRAD^VIOLETA^MARIA DOLORES     [facility]  Echocardiography Laboratory  [address] [place] [address]     Name: MAYTE RESENDIZ  MRN: [id number]  : 1970  Study Date: 2022 01:18 PM  Age: 51 yrs  Gender: Male  Patient Location: [facility]  Reason For Study: Chest Pain  History: Family History  Ordering Physician: VIOLETA SALINAS  Referring Physician: AMANDA CONTI  Performed By: Rebecca Young RDCS     BSA: 1.9 m2  Height: 68 in  Weight: 165 lb  HR: 71  BP: 140/80 mmHg  ______________________________________________________________________________  Procedure  Stress Echo Complete. Contrast Optison.  ______________________________________________________________________________  Interpretation Summary  Patient exercised for total of 7 METS. Patient experienced shortness of breath  lightheadedness and chest pain with exercise.  There was 0.5 to 1 mm flat ST depression in the precordial leads with peak  exercise.  Madden treadmill score was intermediate suggestive of intermediate probability  of obstructive coronary artery disease.  Grossly normal wall motion at rest and with exercise. Small area of basal  lateral ischemia cannot be ruled out on stress  "imaging.  ______________________________________________________________________________  Stress  The patient exercised 4:55.  Exercise was stopped due to fatigue.  The patient exhibited no chest pain during exercise.  There was a normal BP response to exercise.  RPP 21,900.  Target Heart Rate was achieved.  A low workload was achieved.  The Duke treadmill score was intermediate risk ( -11< Madden score <5).     Stress Results             Protocol:  Deven          Maximum Predicted HR:   168 bpm             Target HR: 143 bpm        % Maximum Predicted HR: 87 %       Stage  DurationHeart Rate  BP                     Comment            (mm:ss)   (bpm)   Stage 1   3:00      125   142/80   Stage 2   1:55      146   150/80Lightheadedness, SOB, \"Chest Tightness\" 3/10  RecoveryR  6:00      90    130/80Symptoms resolved in recovery            Stress Duration:   4:55 mm:ss *        Recovery Time: 6:00 mm:ss         Maximum Stress HR: 146 bpm *           METS:          7     Left Ventricle  The left ventricle is normal in size. There is normal left ventricular wall  thickness. Left ventricular systolic function is normal.     Right Ventricle  The right ventricle is normal in size and function.     Aortic Valve  No hemodynamically significant valvular aortic stenosis.     Pericardium  There is no pericardial effusion.  ______________________________________________________________________________  Report approved by: Denise Matamoros 2022 02:03 PM     ______________________________________________________________________________      Echocardiogram Complete    Narrative    [id number]  [id number]  [id number]  [id number]^CINDY^CARLOS     [facility]  Echocardiography Laboratory  [address] [place] [address]     Name: MAYTE RESENDIZ  MRN: [id number]  : 1970  Study Date: 2022 08:46 AM  Age: 51 yrs  Gender: Male  Patient Location: [facility]  Reason For Study: Chest Pain  Ordering " Physician: CARLOS WHITE  Referring Physician: AMANDA CONTI  Performed By: Navya Heart CLIF     BSA: 1.9 m2  Height: 68 in  Weight: 165 lb  HR: 74  BP: 120/82 mmHg  ______________________________________________________________________________  Procedure  Complete Portable Echo Adult.  ______________________________________________________________________________  Interpretation Summary     Normal adult cardiac echo  ______________________________________________________________________________  Left Ventricle  The left ventricle is normal in size. There is normal left ventricular wall  thickness. The left ventricular ejection fraction is normal. Grade I or early  diastolic dysfunction. Diastolic Doppler findings (E/E' ratio and/or other  parameters) suggest left ventricular filling pressures are normal. No regional  wall motion abnormalities noted.     Right Ventricle  The right ventricle is normal in structure, function and size.     Atria  Normal left atrial size. Right atrial size is normal. There is no atrial shunt  seen.     Mitral Valve  The mitral valve leaflets appear normal. There is no evidence of stenosis,  fluttering, or prolapse. There is trace mitral regurgitation.     Tricuspid Valve  Normal tricuspid valve. There is trace tricuspid regurgitation. IVC diameter  <2.1 cm collapsing >50% with sniff suggests a normal RA pressure of 3 mmHg.     Aortic Valve  The aortic valve is normal in structure and function. No aortic regurgitation  is present. No aortic stenosis is present.     Pulmonic Valve  There is no pulmonic valvular regurgitation.     Vessels  Normal size aorta.     Pericardium  There is no pericardial effusion.     Rhythm  Sinus rhythm was noted.  ______________________________________________________________________________  MMode/2D Measurements & Calculations  IVSd: 1.2 cm     LVIDd: 4.0 cm  LVIDs: 2.2 cm  LVPWd: 1.2 cm  FS: 43.5 %  LV mass(C)d: 157.4 grams  LV mass(C)dI: 83.5  "grams/m2  Ao root diam: 3.4 cm  LA dimension: 3.6 cm  LA/Ao: 1.1  RWT: 0.60     Doppler Measurements & Calculations  MV E max marcel: 61.3 cm/sec  MV A max marcel: 68.6 cm/sec  MV E/A: 0.89  MV dec time: 0.21 sec  PA acc time: 0.14 sec  E/E' av.4  Lateral E/e': 7.0  Medial E/e': 7.9     ______________________________________________________________________________  Report approved by: Denise Hilliard 2022 09:17 AM             Echo:  No results found for this or any previous visit (from the past 4320 hour(s)).    Clinically Significant Risk Factors Present on Admission        # Hyponatremia: Lowest Na = 133 mmol/L (Ref range: 136-145) in last 2 days, will monitor as appropriate           # Overweight: Estimated body mass index is 25.09 kg/m  as calculated from the following:    Height as of this encounter: 1.727 m (5' 8\").    Weight as of this encounter: 74.8 kg (165 lb).                    "

## 2022-12-08 NOTE — UTILIZATION REVIEW
"Admission Status; Secondary Review Determination     Under the authority of the Utilization Management Committee, the utilization review process indicated a secondary review on the above patient.  The review outcome is based on review of the medical records, discussions with staff, and applying clinical experience noted on the date of the review.       (x) Observation Status Appropriate - This patient does not meet hospital inpatient criteria and is placed in observation status. If this patient's primary payer is Medicare and was admitted as an inpatient, Condition Code 44 should be used and patient status changed to \"observation\".     RATIONALE FOR DETERMINATION:  51-year-old male with history of hypertension, hyperlipidemia, moderate coronary artery calcifications on CT imaging, asthma who presented to the hospital with intermittent chest discomfort during the last week while shoveling snow patient had shortness of breath and episode of diaphoresis and dizziness.  The symptoms resolved within 1 to 2 hours after resting.  Patient also has had sharp left-sided chest pain that radiates into his left shoulder and arm along with some substernal chest tightness.  Patient presents to hospital with normal troponin levels and a stress echocardiogram that did not show wall motion abnormalities with exercise but there was mild ST segment depression on the treadmill test.  With patient's multiple different potentially cardiac related symptoms, plan was for coronary angiography.  Observation care appropriate to evaluate patient's multiple chest pain and shortness of breath symptoms, if patient found to have significant obstructive CAD then at that time intervention and inpatient care would be appropriate.        The severity of illness, intensity of service provided, expected LOS and risk for adverse outcome make the care appropriate for further observation; however, doesn't meet criteria for hospital inpatient admission. This " was discussed with attending physician who concurred with this determination.    The information on this document is developed by the utilization review team in order for the business office to ensure compliance.  This only denotes the appropriateness of proper admission status and does not reflect the quality of care rendered.         The definitions of Inpatient Status and Observation Status used in making the determination above are those provided in the CMS Coverage Manual, Chapter 1 and Chapter 6, section 70.4.      Sincerely,     Gui Rae MD    Physician Advisor  Utilization Review/ Case Management  [facility].

## 2022-12-08 NOTE — PLAN OF CARE
PRIMARY DIAGNOSIS: CHEST PAIN  OUTPATIENT/OBSERVATION GOALS TO BE MET BEFORE DISCHARGE:  1. Ruled out acute coronary syndrome (negative or stable Troponin):  yes, with abnormal stress test and EKG 12 lead  2. Pain Status: Improved-controlled with oral pain medications.  3. Appropriate provocative testing performed: No, Angio in AM  - Stress Test Procedure: Echo  - Interpretation of cardiac rhythm per telemetry tech: SR    4. Cleared by Consultants (if applicable):No  5. Return to near baseline physical activity: No, pt minimized activity    Pt educated on angio procedure, vitals are stable.  Instructed to report and increase in SOB or pain.  States currently very minimal chest pain. Declined tylenol.  Trops negative, placed on bedside cardiac monitor with a NSR   Discharge Planner Nurse   Safe discharge environment identified: No  Barriers to discharge:  Angio first       Entered by: Mikala Draper RN 12/07/2022 7:38 PM     Please review provider order for any additional goals.   Nurse to notify provider when observation goals have been met and patient is ready for discharge.          Goal Outcome Evaluation: ongoing

## 2022-12-08 NOTE — PROGRESS NOTES
Patient Transfer Information    Patient tolerated transfer: Yes    Patient connected to monitoring equipment on arrival (if yes, what equipment): No    Safety equipment at bedside (if applicable): No -N/A     Patient connected to wall oxygen on arrival (if applicable): No -N/A    Belongings: Transferred with patient    Report received from Tejas Romero RN.  Rosy (transporter) physically handed patient off to receiving RN: Yes    I have reviewed the Medications, Allergies, Past Medical and Surgical History, and Social History in the Epic system. I have reviewed pending test results and orders. No further questions at this time.      *See flowsheets for vital signs and focused assessment of admission/transfer*

## 2022-12-08 NOTE — PLAN OF CARE
Goal Outcome Evaluation:    Admit to [facility]. Alert and oriented x4. Stand by assist. Patient did not report any shortness of breath nor chest pain. NPO for angiogram today. Tele SR. VSS on RA.

## 2022-12-08 NOTE — PROGRESS NOTES
[facility] [place]    Medicine Progress Note - Hospitalist Service    Date of Admission:  12/7/2022    Assessment & Plan   Nazario Truong is a 51 year old male admitted on 12/7 with a past medical history of hypertension, hyperlipidemia,  basal cell carcinoma, moderate coronary artery calcifications on CT, asthma, ADHD who presents with intermittent chest pain for the last week.  Last week while shoveling snow he started to experience increasing shortness of breath, diaphoresis and became dizzy.  He sat down and symptoms resolved within 1 to 2 hours.  Later that same evening he developed sharp left-sided chest pain that would radiate into the left shoulder and arm.  He also experienced some sternal chest tightness.  Since his episode he has been experiencing chest pain several times a day mostly with exertion and shortness of breath.  His symptoms always improve with rest.  States his mother and father both have coronary artery disease with stent placement.     In the ED troponin and D-dimer were negative.  Echo exercise stress test was performed that showed that showed 0.5 to 1 mm flat ST depression in the precordial leads at peak exercise, patient was symptomatic with chest pain and shortness of breath, Madden treadmill score was in intermediate suggestive of intermediate probability of obstructive coronary artery disease, small area of basal lateral ischemia cannot be ruled out on stress imaging.  ED provider spoke with cardiology who is recommending a coronary angiogram given his presentation, risk factors, and family history.     Unstable angina   Moderate coronary artery calcifications on CT 12/01  Chest pain is concerning for unstable angina. Echo stress test suggestive of intermediate probability of obstructive coronary artery disease. Cardiology consulted by the ED provider who is recommending a coronary cath tomorrow. Not recommending heparin at this time as he has no chest pain or  shortness of breath.  - Repeat second troponin was < 6  - Start heparin if patient develops prolonged chest pain  - received aspirin in ED  - subinguinal nitroglycerin available PRN, after 2 doses contact provider   - Coronary cath planned for tomorrow, NPO at midnight              - hold hydrochlorothiazide, concerta, and Wellbutrin until after the procedure  - Start metoprolol 12.5 Bid due to already fairly well controlled BP   - continue daily aspirin     Hypertension  - Continue PTA lisinopril  - Discontinue hydrochlorothiazide as BP has been well controlled with lisinopril and metoprolol. May experience hypotension with all three medications.   - Start low dose metoprolol 12.5 mg      Hyperlipidemia  - increase atorvastatin to 80 mg daily     Mild hyponatremia - improved  -Suspect likely due to dehydration but hydrochlorothiazide may also be contributing.   -Repeat BMP in a.m.  - discontinuing hydrochlorothiazide due to reason above       Diet: NPO per Anesthesia Guidelines for Procedure/Surgery Except for: Meds  NPO for Medical/Clinical Reasons Except for: Meds    DVT Prophylaxis: Pneumatic Compression Devices  Warner Catheter: Not present  Central Lines: None  Cardiac Monitoring: ACTIVE order. Indication: Chest pain/ ACS rule out (24 hours)  Code Status: Full Code      Disposition Plan possible this evening if no stent placed but likely tomorrow     The patient's care was discussed with the Patient.    CARLOS WHITE PA-C  Hospitalist Service  [facility]  Securely message with the Vocera Web Console (learn more here)  Text page via [employer] Paging/Directory         Clinically Significant Risk Factors Present on Admission         # Hyponatremia: Lowest Na = 133 mmol/L in last 2 days, will monitor as appropriate          # Hypertension: home medication list includes antihypertensive(s)      # Overweight: Estimated body mass index is 25.09 kg/m  as calculated from the following:    Height as  "of this encounter: 1.727 m (5' 8\").    Weight as of this encounter: 74.8 kg (165 lb).           ______________________________________________________________________    Interval History   No chest pain or shortness of breath.  States he had a little chest pressure last evening that resolved.  Reports he had a little bit lightheadedness with standing this morning which may be related to being n.p.o. or adding metoprolol. Otherwise has no new concerns. Denies nausea or vomiting. Feels little anxious about the angiogram procedure.     Data reviewed today: I reviewed all medications, new labs and imaging results over the last 24 hours. I personally reviewed no images or EKG's today.    Physical Exam   Vital Signs: Temp: 98.5  F (36.9  C) Temp src: Oral BP: 117/69 Pulse: 76   Resp: 18 SpO2: 100 % O2 Device: None (Room air)    Weight: 165 lbs 0 oz    GENERAL:  Alert, Comfortable, No acute distress. Laying in bed  PSYCH: pleasant, oriented.  NECK:  Supple  HEART:  Normal S1, S2 with no murmur, RRR  LUNGS:  Normal Respiratory effort. Clear to auscultation bilaterally with no wheezing, rales or ronchi.  ABDOMEN:  Soft, non-tender, non distended.   EXTREMITIES:  No pedal edema, No cyanosis, moving all extremities   SKIN:  Warm, dry to touch. No rash, wound or ulcerations.  NEUROLOGIC: Speech clear, alert and orientated x4, no focal deficits    Data   Recent Labs   Lab 22  0715 22  0932   WBC 4.3 4.5   HGB 14.7 13.9   MCV 93 93    234   * 133*   POTASSIUM 4.5 3.8   CHLORIDE 100 96*   CO2 25 27   BUN 11.8 15.4   CR 0.83 0.73   ANIONGAP 10 10   TERESA 9.5 9.6   * 102*     Recent Results (from the past 24 hour(s))   Echo Stress Echocardiogram    Narrative    [id number]  [id number]  [id number]  [id number]^CHO^VIOLETA^C     [facility]  Echocardiography Laboratory  [address] [place] [address]     Name: ASTRIDMAYTE PHAN VICENTE  MRN: [id number]  : 1970  Study Date: 2022 01:18 " "PM  Age: 51 yrs  Gender: Male  Patient Location: [facility]  Reason For Study: Chest Pain  History: Family History  Ordering Physician: VIOLETA SALINAS  Referring Physician: AMANDA CONTI  Performed By: Rebecca Young RDCS     BSA: 1.9 m2  Height: 68 in  Weight: 165 lb  HR: 71  BP: 140/80 mmHg  ______________________________________________________________________________  Procedure  Stress Echo Complete. Contrast Optison.  ______________________________________________________________________________  Interpretation Summary  Patient exercised for total of 7 METS. Patient experienced shortness of breath  lightheadedness and chest pain with exercise.  There was 0.5 to 1 mm flat ST depression in the precordial leads with peak  exercise.  Madden treadmill score was intermediate suggestive of intermediate probability  of obstructive coronary artery disease.  Grossly normal wall motion at rest and with exercise. Small area of basal  lateral ischemia cannot be ruled out on stress imaging.  ______________________________________________________________________________  Stress  The patient exercised 4:55.  Exercise was stopped due to fatigue.  The patient exhibited no chest pain during exercise.  There was a normal BP response to exercise.  RPP 21,900.  Target Heart Rate was achieved.  A low workload was achieved.  The Duke treadmill score was intermediate risk ( -11< Madden score <5).     Stress Results             Protocol:  Deven          Maximum Predicted HR:   168 bpm             Target HR: 143 bpm        % Maximum Predicted HR: 87 %       Stage  DurationHeart Rate  BP                     Comment            (mm:ss)   (bpm)   Stage 1   3:00      125   142/80   Stage 2   1:55      146   150/80Lightheadedness, SOB, \"Chest Tightness\" 3/10  RecoveryR  6:00      90    130/80Symptoms resolved in recovery            Stress Duration:   4:55 mm:ss *        Recovery Time: 6:00 mm:ss         Maximum Stress HR: 146 bpm *           METS:          " 7     Left Ventricle  The left ventricle is normal in size. There is normal left ventricular wall  thickness. Left ventricular systolic function is normal.     Right Ventricle  The right ventricle is normal in size and function.     Aortic Valve  No hemodynamically significant valvular aortic stenosis.     Pericardium  There is no pericardial effusion.  ______________________________________________________________________________  Report approved by: Denise Matamoros 2022 02:03 PM     ______________________________________________________________________________      Echocardiogram Complete    Narrative    [id number]  [id number]  [id number]  [id number]^CINDY^CARLOS     [facility]  Echocardiography Laboratory  [address] [place] [address]     Name: MAYTE RESENDIZ  MRN: [id number]  : 1970  Study Date: 2022 08:46 AM  Age: 51 yrs  Gender: Male  Patient Location: [facility]  Reason For Study: Chest Pain  Ordering Physician: CARLOS WHITE  Referring Physician: AMANDA CONTI  Performed By: Navya Heart RDCS     BSA: 1.9 m2  Height: 68 in  Weight: 165 lb  HR: 74  BP: 120/82 mmHg  ______________________________________________________________________________  Procedure  Complete Portable Echo Adult.  ______________________________________________________________________________  Interpretation Summary     Normal adult cardiac echo  ______________________________________________________________________________  Left Ventricle  The left ventricle is normal in size. There is normal left ventricular wall  thickness. The left ventricular ejection fraction is normal. Grade I or early  diastolic dysfunction. Diastolic Doppler findings (E/E' ratio and/or other  parameters) suggest left ventricular filling pressures are normal. No regional  wall motion abnormalities noted.     Right Ventricle  The right ventricle is normal in structure, function and size.     Atria  Normal left  atrial size. Right atrial size is normal. There is no atrial shunt  seen.     Mitral Valve  The mitral valve leaflets appear normal. There is no evidence of stenosis,  fluttering, or prolapse. There is trace mitral regurgitation.     Tricuspid Valve  Normal tricuspid valve. There is trace tricuspid regurgitation. IVC diameter  <2.1 cm collapsing >50% with sniff suggests a normal RA pressure of 3 mmHg.     Aortic Valve  The aortic valve is normal in structure and function. No aortic regurgitation  is present. No aortic stenosis is present.     Pulmonic Valve  There is no pulmonic valvular regurgitation.     Vessels  Normal size aorta.     Pericardium  There is no pericardial effusion.     Rhythm  Sinus rhythm was noted.  ______________________________________________________________________________  MMode/2D Measurements & Calculations  IVSd: 1.2 cm     LVIDd: 4.0 cm  LVIDs: 2.2 cm  LVPWd: 1.2 cm  FS: 43.5 %  LV mass(C)d: 157.4 grams  LV mass(C)dI: 83.5 grams/m2  Ao root diam: 3.4 cm  LA dimension: 3.6 cm  LA/Ao: 1.1  RWT: 0.60     Doppler Measurements & Calculations  MV E max marcel: 61.3 cm/sec  MV A max marcel: 68.6 cm/sec  MV E/A: 0.89  MV dec time: 0.21 sec  PA acc time: 0.14 sec  E/E' av.4  Lateral E/e': 7.0  Medial E/e': 7.9     ______________________________________________________________________________  Report approved by: Denise Hilliard 2022 09:17 AM

## 2022-12-09 VITALS
TEMPERATURE: 98.7 F | DIASTOLIC BLOOD PRESSURE: 67 MMHG | SYSTOLIC BLOOD PRESSURE: 106 MMHG | HEART RATE: 72 BPM | RESPIRATION RATE: 18 BRPM | OXYGEN SATURATION: 97 % | WEIGHT: 165 LBS | HEIGHT: 68 IN | BODY MASS INDEX: 25.01 KG/M2

## 2022-12-09 LAB
ANION GAP SERPL CALCULATED.3IONS-SCNC: 9 MMOL/L (ref 7–15)
BUN SERPL-MCNC: 11.4 MG/DL (ref 6–20)
CALCIUM SERPL-MCNC: 9.3 MG/DL (ref 8.6–10)
CHLORIDE SERPL-SCNC: 103 MMOL/L (ref 98–107)
CREAT SERPL-MCNC: 0.81 MG/DL (ref 0.67–1.17)
DEPRECATED HCO3 PLAS-SCNC: 24 MMOL/L (ref 22–29)
ERYTHROCYTE [DISTWIDTH] IN BLOOD BY AUTOMATED COUNT: 12.4 % (ref 10–15)
GFR SERPL CREATININE-BSD FRML MDRD: >90 ML/MIN/1.73M2
GLUCOSE SERPL-MCNC: 109 MG/DL (ref 70–99)
HCT VFR BLD AUTO: 44 % (ref 40–53)
HGB BLD-MCNC: 14 G/DL (ref 13.3–17.7)
MCH RBC QN AUTO: 30.4 PG (ref 26.5–33)
MCHC RBC AUTO-ENTMCNC: 31.8 G/DL (ref 31.5–36.5)
MCV RBC AUTO: 95 FL (ref 78–100)
PLATELET # BLD AUTO: 232 10E3/UL (ref 150–450)
POTASSIUM SERPL-SCNC: 4.3 MMOL/L (ref 3.4–5.3)
RBC # BLD AUTO: 4.61 10E6/UL (ref 4.4–5.9)
SODIUM SERPL-SCNC: 136 MMOL/L (ref 136–145)
WBC # BLD AUTO: 4.5 10E3/UL (ref 4–11)

## 2022-12-09 PROCEDURE — 82310 ASSAY OF CALCIUM: CPT

## 2022-12-09 PROCEDURE — 94640 AIRWAY INHALATION TREATMENT: CPT

## 2022-12-09 PROCEDURE — 250N000013 HC RX MED GY IP 250 OP 250 PS 637: Performed by: INTERNAL MEDICINE

## 2022-12-09 PROCEDURE — 999N000157 HC STATISTIC RCP TIME EA 10 MIN

## 2022-12-09 PROCEDURE — 99217 PR OBSERVATION CARE DISCHARGE: CPT | Mod: FS

## 2022-12-09 PROCEDURE — G0378 HOSPITAL OBSERVATION PER HR: HCPCS

## 2022-12-09 PROCEDURE — 36415 COLL VENOUS BLD VENIPUNCTURE: CPT

## 2022-12-09 PROCEDURE — 85027 COMPLETE CBC AUTOMATED: CPT

## 2022-12-09 PROCEDURE — 99214 OFFICE O/P EST MOD 30 MIN: CPT | Performed by: INTERNAL MEDICINE

## 2022-12-09 PROCEDURE — 250N000013 HC RX MED GY IP 250 OP 250 PS 637

## 2022-12-09 RX ORDER — METOPROLOL TARTRATE 25 MG/1
12.5 TABLET, FILM COATED ORAL 2 TIMES DAILY
Qty: 30 TABLET | Refills: 0 | Status: SHIPPED | OUTPATIENT
Start: 2022-12-09 | End: 2023-01-24

## 2022-12-09 RX ORDER — ATORVASTATIN CALCIUM 80 MG/1
80 TABLET, FILM COATED ORAL DAILY
Qty: 14 TABLET | Refills: 0 | Status: SHIPPED | OUTPATIENT
Start: 2022-12-10 | End: 2023-01-24

## 2022-12-09 RX ADMIN — FLUTICASONE FUROATE AND VILANTEROL TRIFENATATE 1 PUFF: 100; 25 POWDER RESPIRATORY (INHALATION) at 08:40

## 2022-12-09 RX ADMIN — LISINOPRIL 20 MG: 20 TABLET ORAL at 08:23

## 2022-12-09 RX ADMIN — ATORVASTATIN CALCIUM 80 MG: 40 TABLET, FILM COATED ORAL at 08:23

## 2022-12-09 RX ADMIN — ASPIRIN 81 MG CHEWABLE TABLET 81 MG: 81 TABLET CHEWABLE at 08:23

## 2022-12-09 RX ADMIN — METOPROLOL TARTRATE 12.5 MG: 25 TABLET, FILM COATED ORAL at 08:23

## 2022-12-09 RX ADMIN — SENNOSIDES AND DOCUSATE SODIUM 1 TABLET: 50; 8.6 TABLET ORAL at 08:23

## 2022-12-09 ASSESSMENT — ACTIVITIES OF DAILY LIVING (ADL)
ADLS_ACUITY_SCORE: 21

## 2022-12-09 NOTE — PROGRESS NOTES
"[facility] Progress Note     Pepe Myers MD  12/09/2022         Interval History:      Feels well, denies chest discomfort, shortness of breath, telemetry reviewed significant artifact but no clear-cut arrhythmia noted.       Assessment and Plan:      1.  Exertional chest discomfort.  Borderline abnormal stress echocardiogram.  Some resting chest discomfort.  Symptoms a mix of some typical but some atypical symptoms.  Coronary angiogram showed overall mild nonobstructive coronary disease with FFR of proximal LAD 0.97 indicating this is not flow-limiting..  2.  Dyslipidemia with LDL of 128  3.  Hypertension on lisinopril, well controlled  4.  In the past he had also symptoms of vertigo nausea room spinning.  Will defer to internal medicine but if this recurs recommend review with neurology.    Recommendations  Discussed in detail with patient and his brother Pancho who is an infectious disease physician in [place].  Overall coronary angiogram showed nonobstructive coronary disease.  We discussed potential for small vessel disease versus coronary vasospasm although clinical suspicion for latter is low given symptoms have not happened at rest and has happened intermittently only with physical activity.  Recommend aspirin 81 mg daily, Lipitor 80 mg daily, common side effects of higher intensity statin were discussed in detail with patient.  Continue low-dose metoprolol 12.5 mg twice daily.  Beta-blocker can be increased in future if patient encounters more exertional related chest discomfort.   Follow-up with KAREN in cardiology in 1 to 2 weeks.           Physical Exam:       , Blood pressure 110/65, pulse 86, temperature 98.7  F (37.1  C), temperature source Oral, resp. rate 18, height 1.727 m (5' 8\"), weight 74.8 kg (165 lb), SpO2 97 %.  Vitals:    12/07/22 0929   Weight: 74.8 kg (165 lb)     Vital Signs with Ranges  Temp:  [98  F (36.7  C)-98.7  F (37.1  C)] 98.7  F (37.1  C)  Pulse:  [65-86] 86  Resp:  [16-20] 18  BP: " (100-130)/(59-78) 110/65  SpO2:  [91 %-98 %] 97 %  I/O's Last 24 hours  No intake/output data recorded.  General patient appears comfortable  Neck normal JVP  Cardiovascular system S1-S2 normal no murmur rub or gallop  Extremities right upper extremity distal perfusion normal  Neurological alert, oriented             Medications:          aspirin  81 mg Oral Daily     atorvastatin  80 mg Oral Daily     [Held by provider] buPROPion  450 mg Oral QAM     fluticasone-vilanterol  1 puff Inhalation Daily     lisinopril  20 mg Oral BID     [Held by provider] metFORMIN  500 mg Oral Daily with supper     [Held by provider] methylphenidate  27 mg Oral QAM     metoprolol tartrate  12.5 mg Oral BID     sodium chloride (PF)  3 mL Intracatheter Q8H     PRN Meds: acetaminophen, albuterol, clonazePAM, fentaNYL, HOLD MEDICATION (one time), HOLD MEDICATION, HYDROmorphone, lidocaine 4%, lidocaine (buffered or not buffered), melatonin, midazolam, naloxone **OR** naloxone **OR** naloxone **OR** naloxone, nitroGLYcerin, ondansetron **OR** ondansetron, oxyCODONE **OR** oxyCODONE, senna-docusate **OR** senna-docusate, sodium chloride (PF)         Data:      All new lab and imaging data was reviewed.   Recent Labs   Lab Test 12/09/22  0540 12/08/22  0715 12/07/22  0932   WBC 4.5 4.3 4.5   HGB 14.0 14.7 13.9   MCV 95 93 93    230 234      Recent Labs   Lab Test 12/09/22  0540 12/08/22  1207 12/08/22  0715 12/07/22  0932     --  135* 133*   POTASSIUM 4.3  --  4.5 3.8   CHLORIDE 103  --  100 96*   CO2 24  --  25 27   BUN 11.4  --  11.8 15.4   CR 0.81  --  0.83 0.73   ANIONGAP 9  --  10 10   TERESA 9.3  --  9.5 9.6   * 124* 124* 102*     No lab results found.    Invalid input(s): TROP, TROPONINIES     Pepe Myers MD  12/9/2022  Pager:  [phone]

## 2022-12-09 NOTE — PLAN OF CARE
"PRIMARY DIAGNOSIS: \"Abnormal Stress Test   OUTPATIENT/OBSERVATION GOALS TO BE MET BEFORE DISCHARGE:  1. ADLs back to baseline: Yes    2. Activity and level of assistance: Ambulating independently.    3. Pain status: Pain free.    4. Return to near baseline physical activity: Yes     Discharge Planner Nurse   Safe discharge environment identified: Yes  Barriers to discharge: Yes       Entered by: Connie Dubois RN 12/09/2022 5:19 AM     Please review provider order for any additional goals.   Nurse to notify provider when observation goals have been met and patient is ready for discharge.          Goal Outcome Evaluation:       A&OX4. LS clear. Oxygen 95% on room air. Denied pain. Up with assist of 2 with assist of 1 to bathroom . Angio sit intact with arm board, No  bleeding noted. CMS intact.  Tele SR.  Continue POC and monitoring                  "

## 2022-12-09 NOTE — PLAN OF CARE
Goal Outcome Evaluation:    Vitals: Temp: 98  F (36.7  C) Temp src: Oral BP: 108/78 Pulse: 76   Resp: 20 SpO2: 98 % O2 Device: None (Room air)    Pain: Denies  Neuro: AO4, able to make needs known  Respiratory: LS clr, denies SOB  Cardiac/tele: Tele SR, denies CP  GI/: WDL  Skin: WDL; R radial angio site WDL; TR band off   LDAs: PIV to left SL  Diet: Cardiac  Activity: SBA  Plan: Angio completed today, cards following, likely discharge tomorrow. Safety maintained.

## 2022-12-09 NOTE — PLAN OF CARE
Vss A&Ox4 up ind. Denies cp. Right wrist angio site CDI. CMS intact. Discharge inst reviewed. Pt instructed to  meds at [facility] per pt request.   .rhobPRIMARY DIAGNOSIS: CHEST PAIN  OUTPATIENT/OBSERVATION GOALS TO BE MET BEFORE DISCHARGE:  1. Ruled out acute coronary syndrome (negative or stable Troponin):  Yes  2. Pain Status: Pain free.  3. Appropriate provocative testing performed: Yes  - Stress Test Procedure: echo  - Interpretation of cardiac rhythm per telemetry tech: SR     4. Cleared by Consultants (if applicable):Yes  5. Return to near baseline physical activity: Yes  Discharge Planner Nurse   Safe discharge environment identified: Yes  Barriers to discharge: No       Entered by: Ty Ford RN 12/09/2022 11:03 AM     Please review provider order for any additional goals.   Nurse to notify provider when observation goals have been met and patient is ready for discharge.

## 2022-12-09 NOTE — PROGRESS NOTES
Dr. Myers already saw patient.     This note is for use for follow up.       Summary: Nazario Truong is a 51 year old male with history of dyslipidemia, HTN, CAD, who was admitted on 12/7/2022 with chest pain.     12/1/22 lung cancer screening CT incidentally noted moderate coronary calcifications.     He had some chest discomfort while shoveling snow a week ago.  He underwent exercise stress echocardiogram 12/7/22 where he had some chest discomfort with exertion, and 0.5 to 1 mm ST depression. Although no clear-cut wall motion abnormality was noted on stress echocardiogram the reader felt that basal lateral wall ischemia cannot be excluded.      Baseline EKG shows sinus rhythm without any acute ST-T changes.   High sensitive troponin within reference range.            Assessment & Plan   Exertional chest discomfort.    CAD.   - 12/1/22 newly diagnosed coronary disease on the basis of moderate coronary calcification on CT chest done for lung cancer screening  - 12/7/22 borderline abnormal stress echocardiogram.    - No EKG changes  - troponin within reference range  - Echo 12/7/22: normal  - Cardiac cath 12/8/22: mild CAD, FFR of LAD 0.97, no PCI  - ASA 81 mg, metoprolol, lisinopril, atorvastatin       Dyslipidemia  - On atorvastatin 10 mg  - LDL of 128  - Increased atorvastatin to 80 mg given his CAD at a young age  - FLP/ALT in 6-8 weeks      Hypertension   - PTA on lisinopril 20 mg BID, hydrochlorothiazide 25 mg  - Na was a little low, BP was well controlled  - Currently on lisinopril 20 mg BID, metoprolol 12.5 mg BID, would continue this regimen               Meenu Perez PA-C

## 2022-12-09 NOTE — DISCHARGE SUMMARY
[facility]  Hospitalist Discharge Summary      Date of Admission:  12/7/2022  Date of Discharge:  12/9/2022  Discharging Provider: CARLOS WHITE PA-C  Discharge Service: Hospitalist Service    Discharge Diagnoses   Exertional chest discomfort   Mild nonobstructive coronary disease  Dyslipidemia   Essential Hypertension     Follow-ups Needed After Discharge   Follow-up Appointments     Follow-up and recommended labs and tests       Follow up with primary care provider, Dorys Zafar, within 1-2 weeks   for hospital follow- up.  The following labs/tests are recommended: if   continues to have shortness of breath episodes may consider pulmonary   function testing or pulmonology consult outpatient           Discharge Disposition   Discharged to home  Condition at discharge: Stable    Hospital Course   Nazario Truong is a 51 year old male admitted on 12/7 with a past medical history of hypertension, hyperlipidemia,  basal cell carcinoma, moderate coronary artery calcifications on CT, asthma, and ADHD who presents with intermittent chest pain for the last week. Last week while shoveling snow he started to experience increasing shortness of breath, diaphoresis and became dizzy.  He sat down and symptoms resolved within 1 to 2 hours.  Later that same evening he developed sharp left-sided chest pain that would radiate into the left shoulder and arm.  He also experienced some sternal chest tightness.  Since his episode, he has been experiencing chest pain several times a day mostly with exertion and shortness of breath.  His symptoms always improve with rest. States his mother and father both have coronary artery disease with stent placement.     In the ED troponins and D-dimer were negative.  Echo exercise stress test was performed that showed that showed 0.5 to 1 mm flat ST depression in the precordial leads at peak exercise, patient was symptomatic with chest pain and shortness of breath,  Madden treadmill score was in intermediate suggestive of intermediate probability of obstructive coronary artery disease, small area of basal lateral ischemia cannot be ruled out on stress imaging.  ED provider spoke with cardiology who is recommending a coronary angiogram given his presentation, risk factors, and family history. Coronary angiogram was performed on 12/8 that showed overall mild nonobstructive coronary artery disease.  Recommended that he continues a daily aspirin 81 mg, Lipitor was increased to 80 mg, and low-dose metoprolol 12.5 mg twice a day was started. LDL was elevated at 128 during admission. He should continue his lisinopril, however his hydrochlorothiazide was discontinued due to already optimized blood pressure control.  Educated patient to check his blood pressure at home due to the new medication adjustments and to record the BP readings.  He should follow-up with cardiology in 1 to 2 weeks.    If the patient continues to have shortness of breath or is experiencing more dizzy like symptoms he may benefit from PFT/pulmonology or neurology consult.  He never endorsed vertigo-like symptoms to me, however mentioned to cardiology that he has vertigo type symptoms over the last several months.  Patient should follow-up with his primary care provider in 1 to 2 weeks.  Patient was educated on return precautions.    Exertional chest discomfort   Moderate coronary artery calcifications on CT 12/01  Chest pain is concerning for unstable angina. Echo stress test suggestive of intermediate probability of obstructive coronary artery disease. Cardiology consulted by the ED provider who is recommending a coronary cath tomorrow. Not recommending heparin at this time as he has no chest pain or shortness of breath.  - Repeat second troponin was < 6  - Start heparin if patient develops prolonged chest pain  - received aspirin in ED  - subinguinal nitroglycerin available PRN, after 2 doses contact provider   -  Coronary cath planned for 12/8, NPO at midnight              - hold hydrochlorothiazide, concerta, and Wellbutrin until after the procedure  - Start low dose metoprolol 12.5 Bid due to already fairly well controlled BP   - continue daily aspirin     Hypertension  - Continue PTA lisinopril  - Discontinue hydrochlorothiazide as BP has been well controlled with lisinopril and metoprolol. May experience hypotension with all three medications.   - Start low dose metoprolol 12.5 mg      Hyperlipidemia  - increase atorvastatin to 80 mg daily     Mild hyponatremia - resolved  -Suspect likely due to dehydration but hydrochlorothiazide may also be contributing.   - discontinuing hydrochlorothiazide due to reason above      Consultations This Hospital Stay   CARDIOLOGY IP CONSULT  PHARMACY IP CONSULT  PHARMACY IP CONSULT  SMOKING CESSATION PROGRAM IP CONSULT    Code Status   Full Code    Time Spent on this Encounter   ICARLOS PA-C, personally saw the patient today and spent less than or equal to 30 minutes discharging this patient.       CARLOS WHITE PA-C  [place] [id number] [facility]  [address] [place] [address]  Phone: [phone]  Fax: [phone]  ______________________________________________________________________    Physical Exam   Vital Signs: Temp: 98.7  F (37.1  C) Temp src: Oral BP: 110/65 Pulse: 86   Resp: 18 SpO2: 97 % O2 Device: None (Room air)    Weight: 165 lbs 0 oz     GENERAL:  Alert, Comfortable, No acute distress.  PSYCH: pleasant,   HEART:  Normal S1, S2 with no murmur, RRR  LUNGS:  Normal Respiratory effort. Clear to auscultation bilaterally with no wheezing, rales or ronchi.  ABDOMEN:  Soft, non-tender, non distended.   EXTREMITIES:  No pedal edema, No cyanosis.   SKIN:  Warm, dry to touch. No rash, wound or ulcerations.  NEUROLOGIC:  Speech clear, alert and orientated x 4, no focal deficits    Primary Care Physician   Dorys Zafar    Discharge  Orders      Reason for your hospital stay    You were admitted due to your intermittent chest pain and shortness of breath.  We performed an echocardiogram, which is the ultrasound of your heart that showed normal heart function.  We did not see any abnormal heart rhythms during your admission.  Your blood work was unremarkable.  Your hemoglobin A1c was 5.6.  Cardiology was consulted who recommended a coronary angiogram.  You had a coronary angiogram performed 12/8 that showed mild coronary artery disease and did not require any intervention. Please follow-up with your primary care provider in 1 to 2 weeks weeks for basic hospital follow-up.  If you continue to have intermittent shortness of breath your primary care provider can help arrange further outpatient follow-up. If you develop any severe chest pain or shortness of breath that does not resolve in 5 to 10 minutes you should go to the emergency department.  A few of your blood pressure medications were adjusted during admission however you will resume your previous blood pressure medications at discharge.     Follow-up and recommended labs and tests     Follow up with primary care provider, Dorys Zafar, within 1-2 weeks for hospital follow- up.  The following labs/tests are recommended: if continues to have shortness of breath episodes may consider pulmonary function testing or pulmonology consult outpatient     Activity    Your activity upon discharge: activity as tolerated     Diet    Follow this diet upon discharge: Low fat and reduced sodium diet (heart healthy diet)       Significant Results and Procedures   Results for orders placed or performed during the hospital encounter of 12/07/22   Chest XR,  PA & LAT    Narrative    XR CHEST 2 VIEWS 12/7/2022 11:26 AM    HISTORY: chest pain    COMPARISON: 12/1/2022      Impression    IMPRESSION: Small nodular opacity in the right upper lobe, may  correlate with small nodule seen on the recent chest CT.  No  infiltrate, pleural effusion or pneumothorax. The cardiac and  mediastinal silhouettes are normal.    ERAN WILKES MD         SYSTEM ID:  [id number]   Echo Stress Echocardiogram    Narrative    [id number]  [facility]  [id number]  [id number]^BRAD^VIOLETA^MARIA DOLORES     [facility]  Echocardiography Laboratory  [address] [place] [address]     Name: MAYTE RESENDIZ  MRN: [id number]  : 1970  Study Date: 2022 01:18 PM  Age: 51 yrs  Gender: Male  Patient Location: [facility]  Reason For Study: Chest Pain  History: Family History  Ordering Physician: VIOLETA SALINAS  Referring Physician: AMANDA CONTI  Performed By: Rebecca Young RDCS     BSA: 1.9 m2  Height: 68 in  Weight: 165 lb  HR: 71  BP: 140/80 mmHg  ______________________________________________________________________________  Procedure  Stress Echo Complete. Contrast Optison.  ______________________________________________________________________________  Interpretation Summary  Patient exercised for total of 7 METS. Patient experienced shortness of breath  lightheadedness and chest pain with exercise.  There was 0.5 to 1 mm flat ST depression in the precordial leads with peak  exercise.  Madden treadmill score was intermediate suggestive of intermediate probability  of obstructive coronary artery disease.  Grossly normal wall motion at rest and with exercise. Small area of basal  lateral ischemia cannot be ruled out on stress imaging.  ______________________________________________________________________________  Stress  The patient exercised 4:55.  Exercise was stopped due to fatigue.  The patient exhibited no chest pain during exercise.  There was a normal BP response to exercise.  RPP 21,900.  Target Heart Rate was achieved.  A low workload was achieved.  The Duke treadmill score was intermediate risk ( -11< Madden score <5).     Stress Results             Protocol:  Deven          Maximum Predicted HR:   168 bpm             Target HR: 143 bpm      "   % Maximum Predicted HR: 87 %       Stage  DurationHeart Rate  BP                     Comment            (mm:ss)   (bpm)   Stage 1   3:00      125   142/80   Stage 2   1:55      146   150/80Lightheadedness, SOB, \"Chest Tightness\" 3/10  RecoveryR  6:00      90    130/80Symptoms resolved in recovery            Stress Duration:   4:55 mm:ss *        Recovery Time: 6:00 mm:ss         Maximum Stress HR: 146 bpm *           METS:          7     Left Ventricle  The left ventricle is normal in size. There is normal left ventricular wall  thickness. Left ventricular systolic function is normal.     Right Ventricle  The right ventricle is normal in size and function.     Aortic Valve  No hemodynamically significant valvular aortic stenosis.     Pericardium  There is no pericardial effusion.  ______________________________________________________________________________  Report approved by: Denise Matamoros 2022 02:03 PM     ______________________________________________________________________________      Echocardiogram Complete    Narrative    [id number]  [id number]  [id number]  [id number]^CINDY^CARLOS     [facility]  Echocardiography Laboratory  [address] [place] [address]     Name: MAYTE RESENDIZ  MRN: [id number]  : 1970  Study Date: 2022 08:46 AM  Age: 51 yrs  Gender: Male  Patient Location: [facility]  Reason For Study: Chest Pain  Ordering Physician: CARLOS WHITE  Referring Physician: AMANDA CONIT  Performed By: Navya Heart RDCS     BSA: 1.9 m2  Height: 68 in  Weight: 165 lb  HR: 74  BP: 120/82 mmHg  ______________________________________________________________________________  Procedure  Complete Portable Echo Adult.  ______________________________________________________________________________  Interpretation Summary     Normal adult cardiac echo  ______________________________________________________________________________  Left Ventricle  The left " ventricle is normal in size. There is normal left ventricular wall  thickness. The left ventricular ejection fraction is normal. Grade I or early  diastolic dysfunction. Diastolic Doppler findings (E/E' ratio and/or other  parameters) suggest left ventricular filling pressures are normal. No regional  wall motion abnormalities noted.     Right Ventricle  The right ventricle is normal in structure, function and size.     Atria  Normal left atrial size. Right atrial size is normal. There is no atrial shunt  seen.     Mitral Valve  The mitral valve leaflets appear normal. There is no evidence of stenosis,  fluttering, or prolapse. There is trace mitral regurgitation.     Tricuspid Valve  Normal tricuspid valve. There is trace tricuspid regurgitation. IVC diameter  <2.1 cm collapsing >50% with sniff suggests a normal RA pressure of 3 mmHg.     Aortic Valve  The aortic valve is normal in structure and function. No aortic regurgitation  is present. No aortic stenosis is present.     Pulmonic Valve  There is no pulmonic valvular regurgitation.     Vessels  Normal size aorta.     Pericardium  There is no pericardial effusion.     Rhythm  Sinus rhythm was noted.  ______________________________________________________________________________  MMode/2D Measurements & Calculations  IVSd: 1.2 cm     LVIDd: 4.0 cm  LVIDs: 2.2 cm  LVPWd: 1.2 cm  FS: 43.5 %  LV mass(C)d: 157.4 grams  LV mass(C)dI: 83.5 grams/m2  Ao root diam: 3.4 cm  LA dimension: 3.6 cm  LA/Ao: 1.1  RWT: 0.60     Doppler Measurements & Calculations  MV E max marcel: 61.3 cm/sec  MV A max marcel: 68.6 cm/sec  MV E/A: 0.89  MV dec time: 0.21 sec  PA acc time: 0.14 sec  E/E' av.4  Lateral E/e': 7.0  Medial E/e': 7.9     ______________________________________________________________________________  Report approved by: Denise Hilliard 2022 09:17 AM         Cardiac Catheterization    Narrative    Mild coronary artery disease       Discharge Medications    Current Discharge Medication List      CONTINUE these medications which have NOT CHANGED    Details   albuterol (PROAIR RESPICLICK) 108 (90 Base) MCG/ACT inhaler Inhale 2 puffs into the lungs      aspirin (ASA) 81 MG chewable tablet Take 81 mg by mouth daily      atorvastatin (LIPITOR) 20 MG tablet Take 1 tablet (20 mg) by mouth daily  Qty: 90 tablet, Refills: 0    Associated Diagnoses: Hyperlipidemia with target LDL less than 130      !! buPROPion (WELLBUTRIN XL) 150 MG 24 hr tablet Take 150 mg by mouth every morning Take with 300mg = 450mg daily      !! buPROPion (WELLBUTRIN XL) 300 MG 24 hr tablet Take 300 mg by mouth every morning Take with 150mg= total of 450mg daily      CLONAZEPAM PO Take 1-2 mg by mouth nightly as needed for anxiety or other (insomnia)      fluticasone-vilanterol (BREO ELLIPTA) 100-25 MCG/INH inhaler Inhale 1 puff into the lungs daily      hydrochlorothiazide (HYDRODIURIL) 25 MG tablet Take 1 tablet (25 mg) by mouth every morning  Qty: 90 tablet, Refills: 0    Comments: PROFILE FOR FUTURE REFILLS UNTIL PATIENT CALLS FOR REFILLS  Associated Diagnoses: HTN (hypertension)      lisinopril (PRINIVIL,ZESTRIL) 20 MG tablet Take 1 tablet (20 mg) by mouth 2 times daily  Qty: 180 tablet, Refills: 0    Comments: PROFILE FOR FUTURE REFILLS UNTIL PATIENT CALLS FOR REFILLS  Associated Diagnoses: HTN (hypertension)      Melatonin 10 MG TABS tablet Take 10 mg by mouth At Bedtime Uses first, then clonazepam if needed      metFORMIN (GLUCOPHAGE) 500 MG tablet Take 500 mg by mouth daily (with dinner)      methylphenidate (CONCERTA) 27 MG CR tablet Take 27 mg by mouth every morning       !! - Potential duplicate medications found. Please discuss with provider.        Allergies   Allergies   Allergen Reactions     Pollen Extract

## 2022-12-12 ENCOUNTER — TELEPHONE (OUTPATIENT)
Dept: CARDIOLOGY | Facility: CLINIC | Age: 52
End: 2022-12-12

## 2022-12-12 NOTE — TELEPHONE ENCOUNTER
Patient was evaluated by cardiology while inpatient for CAD. Called patient and left  to discuss any post hospital d/c questions he may have, review medication changes, and confirm f/u appts.  Patient advised in VM to call clinic with any cardiac related questions or concerns.  Quin Cevallos RN on 12/12/2022 at 11:59 AM

## 2023-01-16 ENCOUNTER — APPOINTMENT (OUTPATIENT)
Dept: URBAN - METROPOLITAN AREA CLINIC 253 | Age: 53
Setting detail: DERMATOLOGY
End: 2023-01-16

## 2023-01-16 VITALS — WEIGHT: 165 LBS | HEIGHT: 68 IN

## 2023-01-16 DIAGNOSIS — D22 MELANOCYTIC NEVI: ICD-10-CM

## 2023-01-16 DIAGNOSIS — D18.0 HEMANGIOMA: ICD-10-CM

## 2023-01-16 DIAGNOSIS — L72.0 EPIDERMAL CYST: ICD-10-CM

## 2023-01-16 DIAGNOSIS — L81.0 POSTINFLAMMATORY HYPERPIGMENTATION: ICD-10-CM

## 2023-01-16 DIAGNOSIS — Z85.828 PERSONAL HISTORY OF OTHER MALIGNANT NEOPLASM OF SKIN: ICD-10-CM

## 2023-01-16 DIAGNOSIS — L82.1 OTHER SEBORRHEIC KERATOSIS: ICD-10-CM

## 2023-01-16 DIAGNOSIS — Z71.89 OTHER SPECIFIED COUNSELING: ICD-10-CM

## 2023-01-16 PROBLEM — D23.62 OTHER BENIGN NEOPLASM OF SKIN OF LEFT UPPER LIMB, INCLUDING SHOULDER: Status: ACTIVE | Noted: 2023-01-16

## 2023-01-16 PROBLEM — D22.5 MELANOCYTIC NEVI OF TRUNK: Status: ACTIVE | Noted: 2023-01-16

## 2023-01-16 PROBLEM — D22.72 MELANOCYTIC NEVI OF LEFT LOWER LIMB, INCLUDING HIP: Status: ACTIVE | Noted: 2023-01-16

## 2023-01-16 PROBLEM — D23.72 OTHER BENIGN NEOPLASM OF SKIN OF LEFT LOWER LIMB, INCLUDING HIP: Status: ACTIVE | Noted: 2023-01-16

## 2023-01-16 PROBLEM — D18.01 HEMANGIOMA OF SKIN AND SUBCUTANEOUS TISSUE: Status: ACTIVE | Noted: 2023-01-16

## 2023-01-16 PROCEDURE — OTHER MIPS QUALITY: OTHER

## 2023-01-16 PROCEDURE — OTHER OBSERVATION: OTHER

## 2023-01-16 PROCEDURE — OTHER COUNSELING: OTHER

## 2023-01-16 PROCEDURE — OTHER OBSERVATION AND MEASURE: OTHER

## 2023-01-16 PROCEDURE — 99213 OFFICE O/P EST LOW 20 MIN: CPT

## 2023-01-16 ASSESSMENT — LOCATION ZONE DERM
LOCATION ZONE: FEET
LOCATION ZONE: NOSE
LOCATION ZONE: SCALP
LOCATION ZONE: FACE
LOCATION ZONE: TRUNK

## 2023-01-16 ASSESSMENT — LOCATION DETAILED DESCRIPTION DERM
LOCATION DETAILED: MID-OCCIPITAL SCALP
LOCATION DETAILED: LEFT MID-UPPER BACK
LOCATION DETAILED: NASAL SUPRATIP
LOCATION DETAILED: RIGHT MEDIAL UPPER BACK
LOCATION DETAILED: LEFT INSTEP
LOCATION DETAILED: RIGHT MEDIAL INFERIOR CHEST
LOCATION DETAILED: INFERIOR THORACIC SPINE
LOCATION DETAILED: RIGHT MID-UPPER BACK
LOCATION DETAILED: GLABELLA
LOCATION DETAILED: LEFT MEDIAL INFERIOR CHEST

## 2023-01-16 ASSESSMENT — LOCATION SIMPLE DESCRIPTION DERM
LOCATION SIMPLE: RIGHT UPPER BACK
LOCATION SIMPLE: LEFT UPPER BACK
LOCATION SIMPLE: GLABELLA
LOCATION SIMPLE: NOSE
LOCATION SIMPLE: CHEST
LOCATION SIMPLE: POSTERIOR SCALP
LOCATION SIMPLE: UPPER BACK
LOCATION SIMPLE: LEFT PLANTAR SURFACE

## 2023-01-16 NOTE — HPI: BODY LOCATION - ARM
Additional History: Pt reports multiple new moles and growths on chest, back and face. Each one is changing in size, color, shape. Some are new.

## 2023-01-24 ENCOUNTER — OFFICE VISIT (OUTPATIENT)
Dept: CARDIOLOGY | Facility: CLINIC | Age: 53
End: 2023-01-24
Attending: INTERNAL MEDICINE
Payer: COMMERCIAL

## 2023-01-24 VITALS
OXYGEN SATURATION: 96 % | DIASTOLIC BLOOD PRESSURE: 70 MMHG | WEIGHT: 164 LBS | HEART RATE: 60 BPM | SYSTOLIC BLOOD PRESSURE: 126 MMHG | BODY MASS INDEX: 24.94 KG/M2

## 2023-01-24 DIAGNOSIS — I25.10 CORONARY ARTERY DISEASE INVOLVING NATIVE CORONARY ARTERY OF NATIVE HEART WITHOUT ANGINA PECTORIS: Primary | ICD-10-CM

## 2023-01-24 PROCEDURE — 99214 OFFICE O/P EST MOD 30 MIN: CPT | Performed by: PHYSICIAN ASSISTANT

## 2023-01-24 RX ORDER — METOPROLOL TARTRATE 25 MG/1
12.5 TABLET, FILM COATED ORAL 2 TIMES DAILY
Qty: 90 TABLET | Refills: 3 | Status: SHIPPED | OUTPATIENT
Start: 2023-01-24

## 2023-01-24 RX ORDER — ATORVASTATIN CALCIUM 80 MG/1
80 TABLET, FILM COATED ORAL DAILY
Qty: 90 TABLET | Refills: 3 | Status: SHIPPED | OUTPATIENT
Start: 2023-01-24

## 2023-01-24 NOTE — PROGRESS NOTES
Cardiology Clinic Progress Note    Nazario Truong MRN# [id number]   YOB: 1970 Age: 52 year old     Primary cardiologist: Dr. Myers         Assessment and Plan     In summary, Nazario Truong presents today for a hospital follow up visit.     1. Exertional chest pain, ?small vessel CAD vs (less likely) vasospasm. Resolved.   2. Mild nonobstructive CAD (2022 angio).   3. Well-treated hypertension.  4. Probable deconditioning.  5. Hyperlipidemia, statin recently intensified.    Plan:  - Continue aspirin, statin, beta blocker.   - FLP/ALT in one month.   - Encouraged to increase cardiovascular exercise and build tolerance.   - Mediterranean-style diet.   Follow-up:  - With Dr. Myers in one year. Of course, he will contact us sooner if chest discomfort returns. In that case, would try increasing beta blocker therapy vs adding Imdur to his regimen.     Sheyla Hernandez PA-C  [facility]         History of Presenting Illness     Nazario Truong is a pleasant 52 year old patient who Dr. Myers recently met in the hospital with the followin.  Exertional chest discomfort with a borderline abnormal stress echocardiogram, followed by coronary angiogram which showed overall mild nonobstructive coronary disease with FFR of proximal LAD 0.97 indicating this is not flow-limiting. Possible etiologies for chest pain were small vessel disease vs (less likely) coronary vasospasm. Started on aspirin and beta blocker, statin intensified.  2.  Dyslipidemia   3.  Hypertension  4.  Normal biventricular function with no VHD on TTE.   5.  Possible vertigo    Today, Nazario presents to clinic stating he feels fine, overall. He continues to note some issues with exercise intolerance (persistetly since this summer), but has had no recurrent chest pain. Has already changed his diet for the better. Most recent 's. BP well-controlled.          Review of Systems     12-pt ROS is negative except  for as noted in the HPI.          Physical Exam     Vitals: /70 (BP Location: Right arm, Patient Position: Sitting, Cuff Size: Adult Regular)   Pulse 60   Wt 74.4 kg (164 lb)   SpO2 96%   BMI 24.94 kg/m    Wt Readings from Last 10 Encounters:   01/24/23 74.4 kg (164 lb)   12/07/22 74.8 kg (165 lb)   05/24/22 78.1 kg (172 lb 2.9 oz)   02/10/19 83.9 kg (185 lb)   07/09/16 81.6 kg (180 lb)   04/13/15 80.3 kg (177 lb)   08/05/14 89.9 kg (198 lb 4.8 oz)   07/21/14 89.5 kg (197 lb 4.8 oz)   04/18/14 86.2 kg (190 lb 1.6 oz)       Constitutional:  Patient is pleasant, alert, cooperative, and in NAD.  HEENT:  NCAT. PERRLA. EOM's intact.   Neck:  CVP appears normal. No carotid bruits.   Pulmonary: Normal respiratory effort. CTAB.   Cardiac: RRR, normal S1/S2, no S3/S4, no murmur or rub.   Abdomen:  Non-tender abdomen, no hepatosplenomegaly appreciated.   Vascular: Pulses in the upper and lower extremities are 2+ and equal bilaterally.  Extremities: No edema, erythema, cyanosis or tenderness appreciated.  Skin:  No rashes or lesions appreciated.   Neurological:  No gross motor or sensory deficits.   Psych: Appropriate affect.          Data   Labs reviewed:  Recent Labs   Lab Test 12/07/22  1543   *   HDL 58   NHDL 156*   CHOL 214*   TRIG 142       Lab Results   Component Value Date    WBC 4.5 12/09/2022    WBC 3.6 (L) 02/10/2019    RBC 4.61 12/09/2022    RBC 4.75 02/10/2019    HGB 14.0 12/09/2022    HGB 14.5 02/10/2019    HCT 44.0 12/09/2022    HCT 42.7 02/10/2019    MCV 95 12/09/2022    MCV 90 02/10/2019    MCH 30.4 12/09/2022    MCH 30.5 02/10/2019    MCHC 31.8 12/09/2022    MCHC 34.0 02/10/2019    RDW 12.4 12/09/2022    RDW 12.1 02/10/2019     12/09/2022     02/10/2019       Lab Results   Component Value Date     12/09/2022     02/10/2019    POTASSIUM 4.3 12/09/2022    POTASSIUM 3.8 05/24/2022    POTASSIUM 3.2 (L) 02/10/2019    CHLORIDE 103 12/09/2022    CHLORIDE 100 05/24/2022     CHLORIDE 100 02/10/2019    CO2 24 12/09/2022    CO2 27 05/24/2022    CO2 23 02/10/2019    ANIONGAP 9 12/09/2022    ANIONGAP 6 05/24/2022    ANIONGAP 10 02/10/2019     (H) 12/09/2022     (H) 12/08/2022    GLC 91 05/24/2022     (H) 02/10/2019    BUN 11.4 12/09/2022    BUN 18 05/24/2022    BUN 9 02/10/2019    CR 0.81 12/09/2022    CR 0.72 02/10/2019    GFRESTIMATED >90 12/09/2022    GFRESTIMATED >90 02/10/2019    GFRESTBLACK >90 02/10/2019    TERESA 9.3 12/09/2022    TERESA 9.0 02/10/2019      Lab Results   Component Value Date    AST 32 02/10/2019    ALT 47 02/10/2019       Lab Results   Component Value Date    A1C 5.6 12/08/2022    A1C 5.9 04/13/2015       No results found for: INR         Problem List     Patient Active Problem List   Diagnosis     Benign essential hypertension     Hyperlipidemia with target LDL less than 130     ADD (attention deficit disorder)     Lumbar disc herniation     Anxiety     Appendicitis with peritonitis     Exertional chest pain     Solitary pulmonary nodule     Abnormal stress echo            Medications     Current Outpatient Medications   Medication Sig Dispense Refill     albuterol (PROAIR RESPICLICK) 108 (90 Base) MCG/ACT inhaler Inhale 2 puffs into the lungs daily       aspirin (ASA) 81 MG chewable tablet Take 81 mg by mouth daily       atorvastatin (LIPITOR) 80 MG tablet Take 1 tablet (80 mg) by mouth daily for 14 days 14 tablet 0     buPROPion (WELLBUTRIN XL) 150 MG 24 hr tablet Take 150 mg by mouth every morning Take with 300mg = 450mg daily       buPROPion (WELLBUTRIN XL) 300 MG 24 hr tablet Take 300 mg by mouth every morning Take with 150mg= total of 450mg daily       CLONAZEPAM PO Take 1-2 mg by mouth nightly as needed for anxiety or other (insomnia)       fluticasone-vilanterol (BREO ELLIPTA) 100-25 MCG/INH inhaler Inhale 1 puff into the lungs as needed       lisinopril (PRINIVIL,ZESTRIL) 20 MG tablet Take 1 tablet (20 mg) by mouth 2 times daily (Patient  taking differently: Take 40 mg by mouth daily) 180 tablet 0     Melatonin 10 MG TABS tablet Take 10 mg by mouth At Bedtime Uses first, then clonazepam if needed       metFORMIN (GLUCOPHAGE) 500 MG tablet Take 500 mg by mouth daily (with dinner)       methylphenidate (CONCERTA) 27 MG CR tablet Take 27 mg by mouth every morning       metoprolol tartrate (LOPRESSOR) 25 MG tablet Take 0.5 tablets (12.5 mg) by mouth 2 times daily for 30 days 30 tablet 0            Past Medical History     Past Medical History:   Diagnosis Date     ADD (attention deficit disorder)     formal evaluation ; managed through [facility]     Anxiety      HTN (hypertension)      Hyperlipidemia LDL goal < 130      Lumbar disc herniation     after lifting injury, 9 months of PT     Past Surgical History:   Procedure Laterality Date     CV CORONARY ANGIOGRAM N/A 2022    Procedure: Coronary Angiogram;  Surgeon: Micky Macedo MD;  Location:  HEART CARDIAC CATH LAB     CV INSTANTANEOUS WAVE-FREE RATIO N/A 2022    Procedure: Instantaneous Wave-Free Ratio;  Surgeon: Micky Macedo MD;  Location:  HEART CARDIAC CATH LAB     HEAD & NECK SURGERY      parathyroid surgery     LAPAROSCOPIC APPENDECTOMY N/A 7/10/2016    Procedure: LAPAROSCOPIC APPENDECTOMY;  Surgeon: Jorge Schaffer MD;  Location: [facility] OR     Family History   Problem Relation Age of Onset     Hypertension Mother      Lipids Mother      Hypertension Father      Lipids Father      C.A.D. Mother         stents in age 50's     C.A.D. Father         stent age 50     Social History     Socioeconomic History     Marital status:      Spouse name: Not on file     Number of children: Not on file     Years of education: Not on file     Highest education level: Not on file   Occupational History     Not on file   Tobacco Use     Smoking status: Former     Years: 25.00     Types: Cigarettes     Quit date: 2010     Years since quittin.0     Smokeless  tobacco: Not on file   Substance and Sexual Activity     Alcohol use: Not Currently     Drug use: No     Sexual activity: Yes     Partners: Female     Birth control/protection: Surgical   Other Topics Concern     Parent/sibling w/ CABG, MI or angioplasty before 65F 55M? Yes   Social History Narrative     Not on file     Social Determinants of Health     Financial Resource Strain: Not on file   Food Insecurity: Not on file   Transportation Needs: Not on file   Physical Activity: Not on file   Stress: Not on file   Social Connections: Not on file   Intimate Partner Violence: Not on file   Housing Stability: Not on file            Allergies   Pollen extract    Today's clinic visit entailed:  Review of the result(s) of each unique test - echo, coronary angiogram, lipid panel  Ordering of each unique test  Prescription drug management  I spent a total of 30 minutes on the day of the visit.   Time spent doing chart review, history and exam, documentation and further activities per the note  Provider  Link to MDM Help Grid     The level of medical decision making during this visit was of moderate complexity.

## 2023-01-24 NOTE — PATIENT INSTRUCTIONS
Today's Plan:   - Fasting blood draw next month for the cholesterol.   - Return to see Dr. Myers in 1 year.     If you have questions or concerns please call my nurse team at [phone].   Scheduling phone number: [phone]  For after hours urgent concerns call [phone] option 2.   Reminder: Please bring in all current medications, over the counter supplements and vitamin bottles to your next appointment.    It was a pleasure seeing you today!     Sheyla Hernandez PA-C

## 2023-01-24 NOTE — LETTER
2023    Dorys Zafar MD  [address] [address]    RE: Nazario Truong       Dear Colleague,     I had the pleasure of seeing Nazario Truong in the [facility].    Cardiology Clinic Progress Note    Nazario Truong MRN# [id number]   YOB: 1970 Age: 52 year old     Primary cardiologist: Dr. Myers         Assessment and Plan     In summary, Nazario Truong presents today for a hospital follow up visit.     1. Exertional chest pain, ?small vessel CAD vs (less likely) vasospasm. Resolved.   2. Mild nonobstructive CAD (2022 angio).   3. Well-treated hypertension.  4. Probable deconditioning.  5. Hyperlipidemia, statin recently intensified.    Plan:  - Continue aspirin, statin, beta blocker.   - FLP/ALT in one month.   - Encouraged to increase cardiovascular exercise and build tolerance.   - Mediterranean-style diet.   Follow-up:  - With Dr. Myers in one year. Of course, he will contact us sooner if chest discomfort returns. In that case, would try increasing beta blocker therapy vs adding Imdur to his regimen.     [facility]         History of Presenting Illness     Nazario Truong is a pleasant 52 year old patient who Dr. Myers recently met in the hospital with the followin.  Exertional chest discomfort with a borderline abnormal stress echocardiogram, followed by coronary angiogram which showed overall mild nonobstructive coronary disease with FFR of proximal LAD 0.97 indicating this is not flow-limiting. Possible etiologies for chest pain were small vessel disease vs (less likely) coronary vasospasm. Started on aspirin and beta blocker, statin intensified.  2.  Dyslipidemia   3.  Hypertension  4.  Normal biventricular function with no VHD on TTE.   5.  Possible vertigo    Today, Nazario presents to clinic stating he feels fine, overall. He continues to note some issues with exercise intolerance  (persistetly since this summer), but has had no recurrent chest pain. Has already changed his diet for the better. Most recent 's. BP well-controlled.          Review of Systems     12-pt ROS is negative except for as noted in the HPI.          Physical Exam     Vitals: /70 (BP Location: Right arm, Patient Position: Sitting, Cuff Size: Adult Regular)   Pulse 60   Wt 74.4 kg (164 lb)   SpO2 96%   BMI 24.94 kg/m    Wt Readings from Last 10 Encounters:   01/24/23 74.4 kg (164 lb)   12/07/22 74.8 kg (165 lb)   05/24/22 78.1 kg (172 lb 2.9 oz)   02/10/19 83.9 kg (185 lb)   07/09/16 81.6 kg (180 lb)   04/13/15 80.3 kg (177 lb)   08/05/14 89.9 kg (198 lb 4.8 oz)   07/21/14 89.5 kg (197 lb 4.8 oz)   04/18/14 86.2 kg (190 lb 1.6 oz)       Constitutional:  Patient is pleasant, alert, cooperative, and in NAD.  HEENT:  NCAT. PERRLA. EOM's intact.   Neck:  CVP appears normal. No carotid bruits.   Pulmonary: Normal respiratory effort. CTAB.   Cardiac: RRR, normal S1/S2, no S3/S4, no murmur or rub.   Abdomen:  Non-tender abdomen, no hepatosplenomegaly appreciated.   Vascular: Pulses in the upper and lower extremities are 2+ and equal bilaterally.  Extremities: No edema, erythema, cyanosis or tenderness appreciated.  Skin:  No rashes or lesions appreciated.   Neurological:  No gross motor or sensory deficits.   Psych: Appropriate affect.          Data   Labs reviewed:  Recent Labs   Lab Test 12/07/22  1543   *   HDL 58   NHDL 156*   CHOL 214*   TRIG 142       Lab Results   Component Value Date    WBC 4.5 12/09/2022    WBC 3.6 (L) 02/10/2019    RBC 4.61 12/09/2022    RBC 4.75 02/10/2019    HGB 14.0 12/09/2022    HGB 14.5 02/10/2019    HCT 44.0 12/09/2022    HCT 42.7 02/10/2019    MCV 95 12/09/2022    MCV 90 02/10/2019    MCH 30.4 12/09/2022    MCH 30.5 02/10/2019    MCHC 31.8 12/09/2022    MCHC 34.0 02/10/2019    RDW 12.4 12/09/2022    RDW 12.1 02/10/2019     12/09/2022     02/10/2019       Lab  Results   Component Value Date     12/09/2022     02/10/2019    POTASSIUM 4.3 12/09/2022    POTASSIUM 3.8 05/24/2022    POTASSIUM 3.2 (L) 02/10/2019    CHLORIDE 103 12/09/2022    CHLORIDE 100 05/24/2022    CHLORIDE 100 02/10/2019    CO2 24 12/09/2022    CO2 27 05/24/2022    CO2 23 02/10/2019    ANIONGAP 9 12/09/2022    ANIONGAP 6 05/24/2022    ANIONGAP 10 02/10/2019     (H) 12/09/2022     (H) 12/08/2022    GLC 91 05/24/2022     (H) 02/10/2019    BUN 11.4 12/09/2022    BUN 18 05/24/2022    BUN 9 02/10/2019    CR 0.81 12/09/2022    CR 0.72 02/10/2019    GFRESTIMATED >90 12/09/2022    GFRESTIMATED >90 02/10/2019    GFRESTBLACK >90 02/10/2019    TERESA 9.3 12/09/2022    TERESA 9.0 02/10/2019      Lab Results   Component Value Date    AST 32 02/10/2019    ALT 47 02/10/2019       Lab Results   Component Value Date    A1C 5.6 12/08/2022    A1C 5.9 04/13/2015       No results found for: INR         Problem List     Patient Active Problem List   Diagnosis     Benign essential hypertension     Hyperlipidemia with target LDL less than 130     ADD (attention deficit disorder)     Lumbar disc herniation     Anxiety     Appendicitis with peritonitis     Exertional chest pain     Solitary pulmonary nodule     Abnormal stress echo            Medications     Current Outpatient Medications   Medication Sig Dispense Refill     albuterol (PROAIR RESPICLICK) 108 (90 Base) MCG/ACT inhaler Inhale 2 puffs into the lungs daily       aspirin (ASA) 81 MG chewable tablet Take 81 mg by mouth daily       atorvastatin (LIPITOR) 80 MG tablet Take 1 tablet (80 mg) by mouth daily for 14 days 14 tablet 0     buPROPion (WELLBUTRIN XL) 150 MG 24 hr tablet Take 150 mg by mouth every morning Take with 300mg = 450mg daily       buPROPion (WELLBUTRIN XL) 300 MG 24 hr tablet Take 300 mg by mouth every morning Take with 150mg= total of 450mg daily       CLONAZEPAM PO Take 1-2 mg by mouth nightly as needed for anxiety or other  (insomnia)       fluticasone-vilanterol (BREO ELLIPTA) 100-25 MCG/INH inhaler Inhale 1 puff into the lungs as needed       lisinopril (PRINIVIL,ZESTRIL) 20 MG tablet Take 1 tablet (20 mg) by mouth 2 times daily (Patient taking differently: Take 40 mg by mouth daily) 180 tablet 0     Melatonin 10 MG TABS tablet Take 10 mg by mouth At Bedtime Uses first, then clonazepam if needed       metFORMIN (GLUCOPHAGE) 500 MG tablet Take 500 mg by mouth daily (with dinner)       methylphenidate (CONCERTA) 27 MG CR tablet Take 27 mg by mouth every morning       metoprolol tartrate (LOPRESSOR) 25 MG tablet Take 0.5 tablets (12.5 mg) by mouth 2 times daily for 30 days 30 tablet 0            Past Medical History     Past Medical History:   Diagnosis Date     ADD (attention deficit disorder) 2006    formal evaluation 2006; managed through [facility]     Anxiety      HTN (hypertension) 2004     Hyperlipidemia LDL goal < 130 2004     Lumbar disc herniation 2013    after lifting injury, 9 months of PT     Past Surgical History:   Procedure Laterality Date     CV CORONARY ANGIOGRAM N/A 12/8/2022    Procedure: Coronary Angiogram;  Surgeon: Micky Macedo MD;  Location:  HEART CARDIAC CATH LAB     CV INSTANTANEOUS WAVE-FREE RATIO N/A 12/8/2022    Procedure: Instantaneous Wave-Free Ratio;  Surgeon: Micky Macedo MD;  Location:  HEART CARDIAC CATH LAB     HEAD & NECK SURGERY  2002    parathyroid surgery     LAPAROSCOPIC APPENDECTOMY N/A 7/10/2016    Procedure: LAPAROSCOPIC APPENDECTOMY;  Surgeon: Jorge Schaffer MD;  Location: [facility] OR     Family History   Problem Relation Age of Onset     Hypertension Mother      Lipids Mother      Hypertension Father      Lipids Father      C.A.D. Mother         stents in age 50's     C.A.D. Father         stent age 50     Social History     Socioeconomic History     Marital status:      Spouse name: Not on file     Number of children: Not on file     Years of education: Not on file     Highest  education level: Not on file   Occupational History     Not on file   Tobacco Use     Smoking status: Former     Years: 25.00     Types: Cigarettes     Quit date: 2010     Years since quittin.0     Smokeless tobacco: Not on file   Substance and Sexual Activity     Alcohol use: Not Currently     Drug use: No     Sexual activity: Yes     Partners: Female     Birth control/protection: Surgical   Other Topics Concern     Parent/sibling w/ CABG, MI or angioplasty before 65F 55M? Yes   Social History Narrative     Not on file     Social Determinants of Health     Financial Resource Strain: Not on file   Food Insecurity: Not on file   Transportation Needs: Not on file   Physical Activity: Not on file   Stress: Not on file   Social Connections: Not on file   Intimate Partner Violence: Not on file   Housing Stability: Not on file            Allergies   Pollen extract    Today's clinic visit entailed:  Review of the result(s) of each unique test - echo, coronary angiogram, lipid panel  Ordering of each unique test  Prescription drug management  I spent a total of 30 minutes on the day of the visit.   Time spent doing chart review, history and exam, documentation and further activities per the note  Provider  Link to [facility] Help Grid     The level of medical decision making during this visit was of moderate complexity.    Thank you for allowing me to participate in the care of your patient.      Sincerely,     Sheyla Hernandez PA-C     [facility]  cc:   Pepe Myers MD  [address]  [place] [address]

## 2023-03-26 ENCOUNTER — HEALTH MAINTENANCE LETTER (OUTPATIENT)
Age: 53
End: 2023-03-26

## 2023-05-27 ENCOUNTER — HEALTH MAINTENANCE LETTER (OUTPATIENT)
Age: 53
End: 2023-05-27

## 2023-07-17 ENCOUNTER — APPOINTMENT (OUTPATIENT)
Dept: URBAN - METROPOLITAN AREA CLINIC 253 | Age: 53
Setting detail: DERMATOLOGY
End: 2023-07-17

## 2023-07-17 VITALS — WEIGHT: 165 LBS | HEIGHT: 68 IN | RESPIRATION RATE: 14 BRPM

## 2023-07-17 DIAGNOSIS — L57.0 ACTINIC KERATOSIS: ICD-10-CM

## 2023-07-17 DIAGNOSIS — D49.2 NEOPLASM OF UNSPECIFIED BEHAVIOR OF BONE, SOFT TISSUE, AND SKIN: ICD-10-CM

## 2023-07-17 DIAGNOSIS — D18.0 HEMANGIOMA: ICD-10-CM

## 2023-07-17 DIAGNOSIS — Z71.89 OTHER SPECIFIED COUNSELING: ICD-10-CM

## 2023-07-17 DIAGNOSIS — D22 MELANOCYTIC NEVI: ICD-10-CM

## 2023-07-17 DIAGNOSIS — B35.1 TINEA UNGUIUM: ICD-10-CM

## 2023-07-17 DIAGNOSIS — L81.4 OTHER MELANIN HYPERPIGMENTATION: ICD-10-CM

## 2023-07-17 DIAGNOSIS — L82.1 OTHER SEBORRHEIC KERATOSIS: ICD-10-CM

## 2023-07-17 PROBLEM — D23.62 OTHER BENIGN NEOPLASM OF SKIN OF LEFT UPPER LIMB, INCLUDING SHOULDER: Status: ACTIVE | Noted: 2023-07-17

## 2023-07-17 PROBLEM — D22.5 MELANOCYTIC NEVI OF TRUNK: Status: ACTIVE | Noted: 2023-07-17

## 2023-07-17 PROBLEM — D18.01 HEMANGIOMA OF SKIN AND SUBCUTANEOUS TISSUE: Status: ACTIVE | Noted: 2023-07-17

## 2023-07-17 PROCEDURE — OTHER LIQUID NITROGEN: OTHER

## 2023-07-17 PROCEDURE — OTHER PRESCRIPTION: OTHER

## 2023-07-17 PROCEDURE — OTHER MIPS QUALITY: OTHER

## 2023-07-17 PROCEDURE — OTHER PHOTO-DOCUMENTATION: OTHER

## 2023-07-17 PROCEDURE — 99213 OFFICE O/P EST LOW 20 MIN: CPT | Mod: 25

## 2023-07-17 PROCEDURE — OTHER ADDITIONAL NOTES: OTHER

## 2023-07-17 PROCEDURE — OTHER COUNSELING: OTHER

## 2023-07-17 PROCEDURE — OTHER BIOPSY BY SHAVE METHOD: OTHER

## 2023-07-17 PROCEDURE — 17000 DESTRUCT PREMALG LESION: CPT | Mod: 59

## 2023-07-17 PROCEDURE — 11102 TANGNTL BX SKIN SINGLE LES: CPT

## 2023-07-17 RX ORDER — CICLOPIROX 80 MG/ML
8% SOLUTION TOPICAL QHS
Qty: 6.6 | Refills: 2 | Status: ERX | COMMUNITY
Start: 2023-07-17

## 2023-07-17 RX ORDER — FLUOROURACIL 5 MG/G
5% CREAM TOPICAL BID
Qty: 40 | Refills: 1 | Status: ERX | COMMUNITY
Start: 2023-07-17

## 2023-07-17 ASSESSMENT — LOCATION SIMPLE DESCRIPTION DERM
LOCATION SIMPLE: LEFT CHEEK
LOCATION SIMPLE: UPPER BACK
LOCATION SIMPLE: LEFT SHOULDER
LOCATION SIMPLE: LOWER BACK
LOCATION SIMPLE: RIGHT GREAT TOE

## 2023-07-17 ASSESSMENT — LOCATION ZONE DERM
LOCATION ZONE: TRUNK
LOCATION ZONE: ARM
LOCATION ZONE: FACE
LOCATION ZONE: TOENAIL

## 2023-07-17 ASSESSMENT — LOCATION DETAILED DESCRIPTION DERM
LOCATION DETAILED: INFERIOR THORACIC SPINE
LOCATION DETAILED: SUPERIOR LUMBAR SPINE
LOCATION DETAILED: RIGHT GREAT TOENAIL
LOCATION DETAILED: LEFT INFERIOR LATERAL MALAR CHEEK
LOCATION DETAILED: LEFT ANTERIOR SHOULDER

## 2023-07-17 NOTE — PROCEDURE: BIOPSY BY SHAVE METHOD
Detail Level: Detailed
Depth Of Biopsy: dermis
Was A Bandage Applied: Yes
Size Of Lesion In Cm: 0
Biopsy Type: H and E
Biopsy Method: Dermablade
Anesthesia Type: 2% lidocaine with epinephrine and a 1:10 solution of 8.4% sodium bicarbonate
Anesthesia Volume In Cc (Will Not Render If 0): 0.1
Hemostasis: Drysol
Wound Care: Petrolatum
Dressing: bandage
Destruction After The Procedure: No
Type Of Destruction Used: Curettage
Curettage Text: The wound bed was treated with curettage after the biopsy was performed.
Cryotherapy Text: The wound bed was treated with cryotherapy after the biopsy was performed.
Electrodesiccation Text: The wound bed was treated with electrodesiccation after the biopsy was performed.
Electrodesiccation And Curettage Text: The wound bed was treated with electrodesiccation and curettage after the biopsy was performed.
Silver Nitrate Text: The wound bed was treated with silver nitrate after the biopsy was performed.
Lab: -[id number]
Consent: Written consent was obtained and risks were reviewed including but not limited to scarring, infection, bleeding, scabbing, incomplete removal, nerve damage and allergy to anesthesia.
Post-Care Instructions: I reviewed with the patient in detail post-care instructions. Patient is to keep the biopsy site dry overnight, and then apply bacitracin twice daily until healed. Patient may apply hydrogen peroxide soaks to remove any crusting.
Notification Instructions: Patient will be notified of biopsy results. However, patient instructed to call the office if not contacted within 2 weeks.
Billing Type: Third-Party Bill
Information: Selecting Yes will display possible errors in your note based on the variables you have selected. This validation is only offered as a suggestion for you. PLEASE NOTE THAT THE VALIDATION TEXT WILL BE REMOVED WHEN YOU FINALIZE YOUR NOTE. IF YOU WANT TO FAX A PRELIMINARY NOTE YOU WILL NEED TO TOGGLE THIS TO 'NO' IF YOU DO NOT WANT IT IN YOUR FAXED NOTE.

## 2023-07-17 NOTE — HPI: FULL BODY SKIN EXAMINATION
What Type Of Note Output Would You Prefer (Optional)?: Standard Output
What Is The Reason For Today's Visit?: Full Body Skin Examination
What Is The Reason For Today's Visit? (Being Monitored For X): concerning skin lesions on an annual basis
Additional History: The patient has several skin changes on the right forehead that he would like looked at, as well as scattered moles across the body.

## 2023-07-17 NOTE — PROCEDURE: ADDITIONAL NOTES
Additional Notes: He will return for a nurse visit in the fall after completing 2 weeks of 5FU treatment.
Detail Level: Simple
Render Risk Assessment In Note?: no

## 2023-11-17 ENCOUNTER — OFFICE VISIT (OUTPATIENT)
Dept: CARDIOLOGY | Facility: CLINIC | Age: 53
End: 2023-11-17
Payer: COMMERCIAL

## 2023-11-17 VITALS
BODY MASS INDEX: 27.92 KG/M2 | WEIGHT: 184.2 LBS | DIASTOLIC BLOOD PRESSURE: 82 MMHG | OXYGEN SATURATION: 98 % | SYSTOLIC BLOOD PRESSURE: 136 MMHG | HEIGHT: 68 IN | HEART RATE: 62 BPM

## 2023-11-17 DIAGNOSIS — I25.10 CORONARY ARTERY DISEASE INVOLVING NATIVE CORONARY ARTERY OF NATIVE HEART WITHOUT ANGINA PECTORIS: ICD-10-CM

## 2023-11-17 PROCEDURE — 99214 OFFICE O/P EST MOD 30 MIN: CPT | Performed by: INTERNAL MEDICINE

## 2023-11-17 RX ORDER — HYDROXYCHLOROQUINE SULFATE 200 MG/1
400 TABLET, FILM COATED ORAL DAILY
COMMUNITY
Start: 2023-08-22

## 2023-11-17 ASSESSMENT — PATIENT HEALTH QUESTIONNAIRE - PHQ9: SUM OF ALL RESPONSES TO PHQ QUESTIONS 1-9: 17

## 2023-11-17 NOTE — LETTER
11/17/2023    Dorys Zafar MD  [address] [place] [address]    RE: Nazario Truong       Dear Colleague,     I had the pleasure of seeing Nazario Truong in the [facility].  HPI and Plan:   Mr Truong is a very pleasant and kind 52-year-old gentleman with history of hypertension, dyslipidemia who I saw about a year ago when he was admitted with some mix of typical and atypical chest discomfort.  He had abnormal stress echocardiogram and subsequent coronary angiogram showed only mild nonobstructive coronary disease.  Proximal LAD had 35 and stenosis with IFR of 0.95 and mild coronary disease elsewhere.  Echocardiogram showed normal LV function.  His Lipitor dose was increased from 40 to 80 mg daily.  Today is coming for routine follow-up.  Patient tells me overall health wise he is feeling better now.  He has been struggling with the symptoms of fatigue and joint pains and tells me that he has been diagnosed with the long COVID syndrome and possible some kind of rheumatological disease through her rheumatologist and is on hydroxychloroquine.  He has been also diagnosed with pulmonary sarcoidosis.  Overall patient tells me in the last few months he is feeling much better no exertional chest discomfort shortness breath dizziness presyncope or syncope.    Assessment plan  Mild nonobstructive coronary disease.  Normal LV function  Hypertension controlled on lisinopril low-dose metoprolol  Dyslipidemia last LDL was 128 at the time of his admission.  He is on Lipitor 80 mg daily tolerating it quite well.  Recommend rechecking lipid panel with ALT    Recommendations  Continue aspirin, Lipitor  Lipid panel with ALT.  My office going to update him with the rest of the lipid panel.  If he continues to feel stable cardiac distress we can see him back in 1 year, sooner if he notes any change in clinical status.    Today's clinic visit entailed:  Review of the result(s) of each unique test -  echocardiogram, coronary angiogram, lipid panel      Orders Placed This Encounter   Procedures    Lipid Profile    ALT    Follow-Up with Cardiology       Orders Placed This Encounter   Medications    hydroxychloroquine (PLAQUENIL) 200 MG tablet     Sig: Take 400 mg by mouth daily       There are no discontinued medications.      Encounter Diagnosis   Name Primary?    Coronary artery disease involving native coronary artery of native heart without angina pectoris        CURRENT MEDICATIONS:  Current Outpatient Medications   Medication Sig Dispense Refill    albuterol (PROAIR RESPICLICK) 108 (90 Base) MCG/ACT inhaler Inhale 2 puffs into the lungs daily      aspirin (ASA) 81 MG chewable tablet Take 81 mg by mouth daily      atorvastatin (LIPITOR) 80 MG tablet Take 1 tablet (80 mg) by mouth daily 90 tablet 3    buPROPion (WELLBUTRIN XL) 150 MG 24 hr tablet Take 150 mg by mouth every morning Take with 300mg = 450mg daily      buPROPion (WELLBUTRIN XL) 300 MG 24 hr tablet Take 300 mg by mouth every morning Take with 150mg= total of 450mg daily      CLONAZEPAM PO Take 1-2 mg by mouth nightly as needed for anxiety or other (insomnia)      fluticasone-vilanterol (BREO ELLIPTA) 100-25 MCG/INH inhaler Inhale 1 puff into the lungs as needed      hydroxychloroquine (PLAQUENIL) 200 MG tablet Take 400 mg by mouth daily      lisinopril (PRINIVIL,ZESTRIL) 20 MG tablet Take 1 tablet (20 mg) by mouth 2 times daily (Patient taking differently: Take 40 mg by mouth daily) 180 tablet 0    Melatonin 10 MG TABS tablet Take 10 mg by mouth At Bedtime Uses first, then clonazepam if needed      metFORMIN (GLUCOPHAGE) 500 MG tablet Take 500 mg by mouth daily (with dinner)      methylphenidate (CONCERTA) 27 MG CR tablet Take 27 mg by mouth every morning      metoprolol tartrate (LOPRESSOR) 25 MG tablet Take 0.5 tablets (12.5 mg) by mouth 2 times daily 90 tablet 3       ALLERGIES     Allergies   Allergen Reactions    Pollen Extract        PAST  MEDICAL HISTORY:  Past Medical History:   Diagnosis Date    ADD (attention deficit disorder) 2006    formal evaluation ; managed through [facility]    Anxiety     HTN (hypertension)     Hyperlipidemia LDL goal < 130     Lumbar disc herniation     after lifting injury, 9 months of PT       PAST SURGICAL HISTORY:  Past Surgical History:   Procedure Laterality Date    CV CORONARY ANGIOGRAM N/A 2022    Procedure: Coronary Angiogram;  Surgeon: Micky Macedo MD;  Location:  HEART CARDIAC CATH LAB    CV INSTANTANEOUS WAVE-FREE RATIO N/A 2022    Procedure: Instantaneous Wave-Free Ratio;  Surgeon: Micky Macedo MD;  Location:  HEART CARDIAC CATH LAB    HEAD & NECK SURGERY      parathyroid surgery    LAPAROSCOPIC APPENDECTOMY N/A 7/10/2016    Procedure: LAPAROSCOPIC APPENDECTOMY;  Surgeon: Jorge Schaffer MD;  Location:  OR       FAMILY HISTORY:  Family History   Problem Relation Age of Onset    Hypertension Mother     Lipids Mother     Hypertension Father     Lipids Father     C.A.D. Mother         stents in age 50's    C.A.D. Father         stent age 50       SOCIAL HISTORY:  Social History     Socioeconomic History    Marital status:      Spouse name: None    Number of children: None    Years of education: None    Highest education level: None   Tobacco Use    Smoking status: Former     Years: 25     Types: Cigarettes     Quit date: 2010     Years since quittin.8   Substance and Sexual Activity    Alcohol use: Not Currently    Drug use: No    Sexual activity: Yes     Partners: Female     Birth control/protection: Surgical   Other Topics Concern    Parent/sibling w/ CABG, MI or angioplasty before 65F 55M? Yes       Review of Systems:  Skin:          Eyes:         ENT:         Respiratory:  Positive for dyspnea on exertion;sleep apnea, longstanding, stable    Cardiovascular:    No particular exertional chest discomfort.  Gastroenterology:        Genitourinary:        "  Musculoskeletal:  Positive for back pain;joint pain Knee pain  Neurologic:         Psychiatric:         Heme/Lymph/Imm:         Endocrine:           Physical Exam:  Vitals: /82 (BP Location: Right arm, Patient Position: Sitting, Cuff Size: Adult Regular)   Pulse 62   Ht 1.727 m (5' 8\")   Wt 83.6 kg (184 lb 3.2 oz)   SpO2 98%   BMI 28.01 kg/m      General patient appears comfortable  Neck normal JVP  Cardiovascular system S1-S2 normal no murmur rub or gallop  Respiratory system clear to auscultation  Extremities no edema    CC  Sheyla Hernandez PA-C  [address]  [place] [address]                  Depression Response    Patient completed the PHQ-9 assessment for depression and scored >9? Yes  Question 9 on the PHQ-9 was positive for suicidality? No  Does patient have current mental health provider? Yes    Is this a virtual visit? No    I personally notified the following: clinic nurse             Depression Screening Follow-up        11/17/2023     4:21 PM   PHQ   PHQ-9 Total Score 17   Q9: Thoughts of better off dead/self-harm past 2 weeks Not at all       Does the patient currently have a mental health provider?  Yes, patient was referred back to current mental health provider. Patient has follow up and is established with mental health provider. No further questions at this time.     Jasmin Eli RN    Thank you for allowing me to participate in the care of your patient.    Sincerely,     Pepe Myers MD     [facility]  cc:   Sheyla Hernandez PA-C  [address], [address]  [place] [address]      "

## 2023-11-17 NOTE — PROGRESS NOTES
Depression Response    Patient completed the PHQ-9 assessment for depression and scored >9? Yes  Question 9 on the PHQ-9 was positive for suicidality? No  Does patient have current mental health provider? Yes    Is this a virtual visit? No    I personally notified the following: clinic nurse

## 2023-11-17 NOTE — PROGRESS NOTES
HPI and Plan:   Mr Truong is a very pleasant and kind 52-year-old gentleman with history of hypertension, dyslipidemia who I saw about a year ago when he was admitted with some mix of typical and atypical chest discomfort.  He had abnormal stress echocardiogram and subsequent coronary angiogram showed only mild nonobstructive coronary disease.  Proximal LAD had 35 and stenosis with IFR of 0.95 and mild coronary disease elsewhere.  Echocardiogram showed normal LV function.  His Lipitor dose was increased from 40 to 80 mg daily.  Today is coming for routine follow-up.  Patient tells me overall health wise he is feeling better now.  He has been struggling with the symptoms of fatigue and joint pains and tells me that he has been diagnosed with the long COVID syndrome and possible some kind of rheumatological disease through her rheumatologist and is on hydroxychloroquine.  He has been also diagnosed with pulmonary sarcoidosis.  Overall patient tells me in the last few months he is feeling much better no exertional chest discomfort shortness breath dizziness presyncope or syncope.    Assessment plan  Mild nonobstructive coronary disease.  Normal LV function  Hypertension controlled on lisinopril low-dose metoprolol  Dyslipidemia last LDL was 128 at the time of his admission.  He is on Lipitor 80 mg daily tolerating it quite well.  Recommend rechecking lipid panel with ALT    Recommendations  Continue aspirin, Lipitor  Lipid panel with ALT.  My office going to update him with the rest of the lipid panel.  If he continues to feel stable cardiac distress we can see him back in 1 year, sooner if he notes any change in clinical status.    Today's clinic visit entailed:  Review of the result(s) of each unique test - echocardiogram, coronary angiogram, lipid panel      Orders Placed This Encounter   Procedures    Lipid Profile    ALT    Follow-Up with Cardiology       Orders Placed This Encounter   Medications     hydroxychloroquine (PLAQUENIL) 200 MG tablet     Sig: Take 400 mg by mouth daily       There are no discontinued medications.      Encounter Diagnosis   Name Primary?    Coronary artery disease involving native coronary artery of native heart without angina pectoris        CURRENT MEDICATIONS:  Current Outpatient Medications   Medication Sig Dispense Refill    albuterol (PROAIR RESPICLICK) 108 (90 Base) MCG/ACT inhaler Inhale 2 puffs into the lungs daily      aspirin (ASA) 81 MG chewable tablet Take 81 mg by mouth daily      atorvastatin (LIPITOR) 80 MG tablet Take 1 tablet (80 mg) by mouth daily 90 tablet 3    buPROPion (WELLBUTRIN XL) 150 MG 24 hr tablet Take 150 mg by mouth every morning Take with 300mg = 450mg daily      buPROPion (WELLBUTRIN XL) 300 MG 24 hr tablet Take 300 mg by mouth every morning Take with 150mg= total of 450mg daily      CLONAZEPAM PO Take 1-2 mg by mouth nightly as needed for anxiety or other (insomnia)      fluticasone-vilanterol (BREO ELLIPTA) 100-25 MCG/INH inhaler Inhale 1 puff into the lungs as needed      hydroxychloroquine (PLAQUENIL) 200 MG tablet Take 400 mg by mouth daily      lisinopril (PRINIVIL,ZESTRIL) 20 MG tablet Take 1 tablet (20 mg) by mouth 2 times daily (Patient taking differently: Take 40 mg by mouth daily) 180 tablet 0    Melatonin 10 MG TABS tablet Take 10 mg by mouth At Bedtime Uses first, then clonazepam if needed      metFORMIN (GLUCOPHAGE) 500 MG tablet Take 500 mg by mouth daily (with dinner)      methylphenidate (CONCERTA) 27 MG CR tablet Take 27 mg by mouth every morning      metoprolol tartrate (LOPRESSOR) 25 MG tablet Take 0.5 tablets (12.5 mg) by mouth 2 times daily 90 tablet 3       ALLERGIES     Allergies   Allergen Reactions    Pollen Extract        PAST MEDICAL HISTORY:  Past Medical History:   Diagnosis Date    ADD (attention deficit disorder) 2006    formal evaluation 2006; managed through [facility]    Anxiety     HTN (hypertension) 2004    Hyperlipidemia  LDL goal < 130     Lumbar disc herniation     after lifting injury, 9 months of PT       PAST SURGICAL HISTORY:  Past Surgical History:   Procedure Laterality Date    CV CORONARY ANGIOGRAM N/A 2022    Procedure: Coronary Angiogram;  Surgeon: Micky Macedo MD;  Location:  HEART CARDIAC CATH LAB    CV INSTANTANEOUS WAVE-FREE RATIO N/A 2022    Procedure: Instantaneous Wave-Free Ratio;  Surgeon: Micky Macedo MD;  Location:  HEART CARDIAC CATH LAB    HEAD & NECK SURGERY  2002    parathyroid surgery    LAPAROSCOPIC APPENDECTOMY N/A 7/10/2016    Procedure: LAPAROSCOPIC APPENDECTOMY;  Surgeon: Jorge Schaffer MD;  Location: [facility] OR       FAMILY HISTORY:  Family History   Problem Relation Age of Onset    Hypertension Mother     Lipids Mother     Hypertension Father     Lipids Father     C.A.D. Mother         stents in age 50's    C.A.D. Father         stent age 50       SOCIAL HISTORY:  Social History     Socioeconomic History    Marital status:      Spouse name: None    Number of children: None    Years of education: None    Highest education level: None   Tobacco Use    Smoking status: Former     Years: 25     Types: Cigarettes     Quit date: 2010     Years since quittin.8   Substance and Sexual Activity    Alcohol use: Not Currently    Drug use: No    Sexual activity: Yes     Partners: Female     Birth control/protection: Surgical   Other Topics Concern    Parent/sibling w/ CABG, MI or angioplasty before 65F 55M? Yes       Review of Systems:  Skin:          Eyes:         ENT:         Respiratory:  Positive for dyspnea on exertion;sleep apnea, longstanding, stable    Cardiovascular:    No particular exertional chest discomfort.  Gastroenterology:        Genitourinary:         Musculoskeletal:  Positive for back pain;joint pain Knee pain  Neurologic:         Psychiatric:         Heme/Lymph/Imm:         Endocrine:           Physical Exam:  Vitals: /82 (BP Location: Right  "arm, Patient Position: Sitting, Cuff Size: Adult Regular)   Pulse 62   Ht 1.727 m (5' 8\")   Wt 83.6 kg (184 lb 3.2 oz)   SpO2 98%   BMI 28.01 kg/m      General patient appears comfortable  Neck normal JVP  Cardiovascular system S1-S2 normal no murmur rub or gallop  Respiratory system clear to auscultation  Extremities no edema    CC  Sheyla Hernandez, PA-C  [address], [address]  [place] [address]                "

## 2023-11-17 NOTE — PROGRESS NOTES
Depression Screening Follow-up        11/17/2023     4:21 PM   PHQ   PHQ-9 Total Score 17   Q9: Thoughts of better off dead/self-harm past 2 weeks Not at all       Does the patient currently have a mental health provider?  Yes, patient was referred back to current mental health provider. Patient has follow up and is established with mental health provider. No further questions at this time.     Jasmin Eli RN

## 2023-12-08 ENCOUNTER — MYC MEDICAL ADVICE (OUTPATIENT)
Dept: CARDIOLOGY | Facility: CLINIC | Age: 53
End: 2023-12-08

## 2023-12-08 ENCOUNTER — TELEPHONE (OUTPATIENT)
Dept: CARDIOLOGY | Facility: CLINIC | Age: 53
End: 2023-12-08

## 2023-12-08 ENCOUNTER — LAB (OUTPATIENT)
Dept: LAB | Facility: CLINIC | Age: 53
End: 2023-12-08
Attending: INTERNAL MEDICINE
Payer: COMMERCIAL

## 2023-12-08 DIAGNOSIS — I25.10 CORONARY ARTERY DISEASE INVOLVING NATIVE CORONARY ARTERY OF NATIVE HEART WITHOUT ANGINA PECTORIS: ICD-10-CM

## 2023-12-08 LAB
ALT SERPL W P-5'-P-CCNC: 37 U/L (ref 0–70)
CHOLEST SERPL-MCNC: 121 MG/DL
FASTING STATUS PATIENT QL REPORTED: YES
HDLC SERPL-MCNC: 56 MG/DL
LDLC SERPL CALC-MCNC: 55 MG/DL
NONHDLC SERPL-MCNC: 65 MG/DL
TRIGL SERPL-MCNC: 52 MG/DL

## 2023-12-08 PROCEDURE — 36415 COLL VENOUS BLD VENIPUNCTURE: CPT | Performed by: INTERNAL MEDICINE

## 2023-12-08 PROCEDURE — 80061 LIPID PANEL: CPT | Performed by: INTERNAL MEDICINE

## 2023-12-08 PROCEDURE — 84460 ALANINE AMINO (ALT) (SGPT): CPT | Performed by: INTERNAL MEDICINE

## 2023-12-08 NOTE — TELEPHONE ENCOUNTER
Team received results of lipid profile\ALT    ALT  37        12\7\22   , Triglycerides  142    Messaged patient via POI.   Routed lab results to Dr. Myers for review.    Faith Morgan RN on 12/8/2023 at 4:28 PM

## 2024-01-17 ENCOUNTER — APPOINTMENT (OUTPATIENT)
Dept: URBAN - METROPOLITAN AREA CLINIC 253 | Age: 54
Setting detail: DERMATOLOGY
End: 2024-01-18

## 2024-01-17 VITALS — RESPIRATION RATE: 14 BRPM | WEIGHT: 170 LBS | HEIGHT: 68 IN

## 2024-01-17 DIAGNOSIS — L663 OTHER SPECIFIED DISEASES OF HAIR AND HAIR FOLLICLES: ICD-10-CM

## 2024-01-17 DIAGNOSIS — L738 OTHER SPECIFIED DISEASES OF HAIR AND HAIR FOLLICLES: ICD-10-CM

## 2024-01-17 DIAGNOSIS — Z71.89 OTHER SPECIFIED COUNSELING: ICD-10-CM

## 2024-01-17 DIAGNOSIS — L82.1 OTHER SEBORRHEIC KERATOSIS: ICD-10-CM

## 2024-01-17 DIAGNOSIS — L81.4 OTHER MELANIN HYPERPIGMENTATION: ICD-10-CM

## 2024-01-17 DIAGNOSIS — L81.0 POSTINFLAMMATORY HYPERPIGMENTATION: ICD-10-CM

## 2024-01-17 DIAGNOSIS — D18.0 HEMANGIOMA: ICD-10-CM

## 2024-01-17 DIAGNOSIS — B35.1 TINEA UNGUIUM: ICD-10-CM

## 2024-01-17 DIAGNOSIS — D22 MELANOCYTIC NEVI: ICD-10-CM

## 2024-01-17 PROBLEM — L02.02 FURUNCLE OF FACE: Status: ACTIVE | Noted: 2024-01-17

## 2024-01-17 PROBLEM — D48.5 NEOPLASM OF UNCERTAIN BEHAVIOR OF SKIN: Status: ACTIVE | Noted: 2024-01-17

## 2024-01-17 PROBLEM — D18.01 HEMANGIOMA OF SKIN AND SUBCUTANEOUS TISSUE: Status: ACTIVE | Noted: 2024-01-17

## 2024-01-17 PROBLEM — D22.5 MELANOCYTIC NEVI OF TRUNK: Status: ACTIVE | Noted: 2024-01-17

## 2024-01-17 PROBLEM — D23.72 OTHER BENIGN NEOPLASM OF SKIN OF LEFT LOWER LIMB, INCLUDING HIP: Status: ACTIVE | Noted: 2024-01-17

## 2024-01-17 PROCEDURE — 11102 TANGNTL BX SKIN SINGLE LES: CPT

## 2024-01-17 PROCEDURE — OTHER BIOPSY BY SHAVE METHOD: OTHER

## 2024-01-17 PROCEDURE — 99213 OFFICE O/P EST LOW 20 MIN: CPT | Mod: 25

## 2024-01-17 PROCEDURE — OTHER MIPS QUALITY: OTHER

## 2024-01-17 PROCEDURE — OTHER PRESCRIPTION: OTHER

## 2024-01-17 PROCEDURE — OTHER COUNSELING: OTHER

## 2024-01-17 PROCEDURE — OTHER ORDER TESTS: OTHER

## 2024-01-17 PROCEDURE — OTHER ADDITIONAL NOTES: OTHER

## 2024-01-17 RX ORDER — TERBINAFINE HYDROCHLORIDE 250 MG/1
TABLET ORAL QD
Qty: 90 | Refills: 0 | Status: ERX | COMMUNITY
Start: 2024-01-17

## 2024-01-17 RX ORDER — CLINDAMYCIN PHOSPHATE 10 MG/ML
LOTION TOPICAL QD
Qty: 60 | Refills: 3 | Status: ERX | COMMUNITY
Start: 2024-01-17

## 2024-01-17 ASSESSMENT — LOCATION DETAILED DESCRIPTION DERM
LOCATION DETAILED: STERNUM
LOCATION DETAILED: LEFT LOWER CUTANEOUS LIP
LOCATION DETAILED: RIGHT GREAT TOENAIL
LOCATION DETAILED: INFERIOR THORACIC SPINE
LOCATION DETAILED: RIGHT MEDIAL SUPERIOR CHEST
LOCATION DETAILED: RIGHT PROXIMAL POSTERIOR UPPER ARM
LOCATION DETAILED: LEFT UPPER CUTANEOUS LIP
LOCATION DETAILED: SUPERIOR LUMBAR SPINE

## 2024-01-17 ASSESSMENT — LOCATION SIMPLE DESCRIPTION DERM
LOCATION SIMPLE: UPPER BACK
LOCATION SIMPLE: RIGHT GREAT TOE
LOCATION SIMPLE: CHEST
LOCATION SIMPLE: LEFT LIP
LOCATION SIMPLE: LOWER BACK
LOCATION SIMPLE: RIGHT UPPER ARM

## 2024-01-17 ASSESSMENT — LOCATION ZONE DERM
LOCATION ZONE: TOENAIL
LOCATION ZONE: TRUNK
LOCATION ZONE: ARM
LOCATION ZONE: LIP

## 2024-01-17 NOTE — PROCEDURE: ORDER TESTS
Expected Date Of Service: 01/17/2024
Performing Laboratory: [id number]
Billing Type: Third-Party Bill
Bill For Surgical Tray: no

## 2024-01-17 NOTE — PROCEDURE: ADDITIONAL NOTES
Render Risk Assessment In Note?: no
Detail Level: Zone
Additional Notes: Post inflammatory reaction to the cyst. No concerns for Basal Cell Carcinoma.
Additional Notes: Recommend that he soak with vinegar water solution (1 part vinegar, 2 parts water) to eradicate any bacteria that may be under the nail. Also recommend using nail polish remover on the area.

## 2024-01-17 NOTE — PROCEDURE: BIOPSY BY SHAVE METHOD
Detail Level: Detailed
Depth Of Biopsy: dermis
Was A Bandage Applied: Yes
Size Of Lesion In Cm: 0
Biopsy Type: H and E
Biopsy Method: Dermablade
Anesthesia Type: 1% lidocaine with epinephrine
Anesthesia Volume In Cc: 0.5
Hemostasis: Drysol
Wound Care: Petrolatum
Dressing: bandage
Destruction After The Procedure: No
Type Of Destruction Used: Curettage
Curettage Text: The wound bed was treated with curettage after the biopsy was performed.
Cryotherapy Text: The wound bed was treated with cryotherapy after the biopsy was performed.
Electrodesiccation Text: The wound bed was treated with electrodesiccation after the biopsy was performed.
Electrodesiccation And Curettage Text: The wound bed was treated with electrodesiccation and curettage after the biopsy was performed.
Silver Nitrate Text: The wound bed was treated with silver nitrate after the biopsy was performed.
Lab: -[id number]
Consent: Verbal consent was obtained and risks were reviewed including but not limited to scarring, infection, bleeding, scabbing, incomplete removal, nerve damage and allergy to anesthesia.
Post-Care Instructions: I reviewed with the patient in detail post-care instructions. Patient is to keep the biopsy site dry overnight, and then apply bacitracin twice daily until healed. Patient may apply hydrogen peroxide soaks to remove any crusting.
Notification Instructions: Patient will be notified of biopsy results. However, patient instructed to call the office if not contacted within 2 weeks.
Billing Type: Third-Party Bill
Information: Selecting Yes will display possible errors in your note based on the variables you have selected. This validation is only offered as a suggestion for you. PLEASE NOTE THAT THE VALIDATION TEXT WILL BE REMOVED WHEN YOU FINALIZE YOUR NOTE. IF YOU WANT TO FAX A PRELIMINARY NOTE YOU WILL NEED TO TOGGLE THIS TO 'NO' IF YOU DO NOT WANT IT IN YOUR FAXED NOTE.

## 2024-01-17 NOTE — HPI: FULL BODY SKIN EXAMINATION
How Severe Are Your Spot(S)?: moderate
What Is The Reason For Today's Visit?: Full Body Skin Examination
What Is The Reason For Today's Visit? (Being Monitored For X): concerning skin lesions on an annual basis
Additional History: He expresses concerns about a lesion by his lip. He states that he originally thought it was a basal cell but then thought it was maybe a cyst as it swelled up and then popped.

## 2024-04-17 ENCOUNTER — APPOINTMENT (OUTPATIENT)
Dept: URBAN - METROPOLITAN AREA CLINIC 253 | Age: 54
Setting detail: DERMATOLOGY
End: 2024-04-22

## 2024-04-17 VITALS — WEIGHT: 170 LBS | HEIGHT: 68 IN | RESPIRATION RATE: 14 BRPM

## 2024-04-17 DIAGNOSIS — B35.1 TINEA UNGUIUM: ICD-10-CM

## 2024-04-17 PROCEDURE — OTHER MIPS QUALITY: OTHER

## 2024-04-17 PROCEDURE — OTHER ADDITIONAL NOTES: OTHER

## 2024-04-17 PROCEDURE — OTHER VENIPUNCTURE: OTHER

## 2024-04-17 PROCEDURE — 99212 OFFICE O/P EST SF 10 MIN: CPT

## 2024-04-17 PROCEDURE — 36415 COLL VENOUS BLD VENIPUNCTURE: CPT

## 2024-04-17 PROCEDURE — OTHER ORDER TESTS: OTHER

## 2024-04-17 PROCEDURE — OTHER COUNSELING: OTHER

## 2024-04-17 PROCEDURE — OTHER PHOTO-DOCUMENTATION: OTHER

## 2024-04-17 ASSESSMENT — LOCATION DETAILED DESCRIPTION DERM
LOCATION DETAILED: RIGHT GREAT TOENAIL
LOCATION DETAILED: RIGHT ANTECUBITAL SKIN

## 2024-04-17 ASSESSMENT — LOCATION SIMPLE DESCRIPTION DERM
LOCATION SIMPLE: RIGHT GREAT TOE
LOCATION SIMPLE: RIGHT ELBOW

## 2024-04-17 ASSESSMENT — LOCATION ZONE DERM
LOCATION ZONE: ARM
LOCATION ZONE: TOENAIL

## 2024-04-17 NOTE — PROCEDURE: ORDER TESTS
Expected Date Of Service: 04/17/2024
Bill For Surgical Tray: no
Performing Laboratory: -[id number]
Billing Type: Third-Party Bill

## 2024-04-17 NOTE — PROCEDURE: ADDITIONAL NOTES
Render Risk Assessment In Note?: no
Detail Level: Zone
Additional Notes: Patient completed 3 months of Terbinafine with 50% improvement. Discussed that this will likely continue to improve with time. Will plan to recheck this at his skin check in July.

## 2024-07-15 ENCOUNTER — APPOINTMENT (OUTPATIENT)
Dept: URBAN - METROPOLITAN AREA CLINIC 253 | Age: 54
Setting detail: DERMATOLOGY
End: 2024-07-22

## 2024-07-15 ENCOUNTER — RX ONLY (RX ONLY)
Age: 54
End: 2024-07-15

## 2024-07-15 VITALS — WEIGHT: 165 LBS | RESPIRATION RATE: 14 BRPM | HEIGHT: 68 IN

## 2024-07-15 DIAGNOSIS — L73.8 OTHER SPECIFIED FOLLICULAR DISORDERS: ICD-10-CM

## 2024-07-15 DIAGNOSIS — Z71.89 OTHER SPECIFIED COUNSELING: ICD-10-CM

## 2024-07-15 DIAGNOSIS — L82.1 OTHER SEBORRHEIC KERATOSIS: ICD-10-CM

## 2024-07-15 DIAGNOSIS — L81.4 OTHER MELANIN HYPERPIGMENTATION: ICD-10-CM

## 2024-07-15 DIAGNOSIS — D18.0 HEMANGIOMA: ICD-10-CM

## 2024-07-15 DIAGNOSIS — B35.1 TINEA UNGUIUM: ICD-10-CM

## 2024-07-15 DIAGNOSIS — D22 MELANOCYTIC NEVI: ICD-10-CM

## 2024-07-15 PROBLEM — D23.72 OTHER BENIGN NEOPLASM OF SKIN OF LEFT LOWER LIMB, INCLUDING HIP: Status: ACTIVE | Noted: 2024-07-15

## 2024-07-15 PROBLEM — D18.01 HEMANGIOMA OF SKIN AND SUBCUTANEOUS TISSUE: Status: ACTIVE | Noted: 2024-07-15

## 2024-07-15 PROBLEM — D22.5 MELANOCYTIC NEVI OF TRUNK: Status: ACTIVE | Noted: 2024-07-15

## 2024-07-15 PROBLEM — D23.62 OTHER BENIGN NEOPLASM OF SKIN OF LEFT UPPER LIMB, INCLUDING SHOULDER: Status: ACTIVE | Noted: 2024-07-15

## 2024-07-15 PROCEDURE — 99213 OFFICE O/P EST LOW 20 MIN: CPT

## 2024-07-15 PROCEDURE — OTHER COUNSELING: OTHER

## 2024-07-15 PROCEDURE — OTHER MIPS QUALITY: OTHER

## 2024-07-15 PROCEDURE — OTHER PRESCRIPTION MEDICATION MANAGEMENT: OTHER

## 2024-07-15 RX ORDER — TERBINAFINE HYDROCHLORIDE 250 MG/1
TABLET ORAL QD
Qty: 90 | Refills: 0 | Status: ERX

## 2024-07-15 ASSESSMENT — LOCATION DETAILED DESCRIPTION DERM
LOCATION DETAILED: LEFT CENTRAL MALAR CHEEK
LOCATION DETAILED: RIGHT ANTERIOR PROXIMAL UPPER ARM
LOCATION DETAILED: RIGHT GREAT TOENAIL
LOCATION DETAILED: INFERIOR THORACIC SPINE
LOCATION DETAILED: RIGHT CENTRAL FRONTAL SCALP
LOCATION DETAILED: LEFT INFERIOR MEDIAL MIDBACK
LOCATION DETAILED: LEFT SUPERIOR UPPER BACK

## 2024-07-15 ASSESSMENT — LOCATION SIMPLE DESCRIPTION DERM
LOCATION SIMPLE: UPPER BACK
LOCATION SIMPLE: RIGHT UPPER ARM
LOCATION SIMPLE: RIGHT GREAT TOE
LOCATION SIMPLE: LEFT LOWER BACK
LOCATION SIMPLE: LEFT UPPER BACK
LOCATION SIMPLE: RIGHT SCALP
LOCATION SIMPLE: LEFT CHEEK

## 2024-07-15 ASSESSMENT — LOCATION ZONE DERM
LOCATION ZONE: TOENAIL
LOCATION ZONE: ARM
LOCATION ZONE: TRUNK
LOCATION ZONE: FACE
LOCATION ZONE: SCALP

## 2024-07-15 NOTE — HPI: FULL BODY SKIN EXAMINATION
What Is The Reason For Today's Visit?: Full Body Skin Examination
What Is The Reason For Today's Visit? (Being Monitored For X): concerning skin lesions on an annual basis
Additional History: He expresses concerns about his scalp as he can feel bumps or “red pimples”.

## 2024-07-15 NOTE — PROCEDURE: PRESCRIPTION MEDICATION MANAGEMENT
Detail Level: Zone
Initiate Treatment: Terbinafine HCl 250mg tablets QD x 3 months
Render In Strict Bullet Format?: No

## 2024-08-04 ENCOUNTER — HEALTH MAINTENANCE LETTER (OUTPATIENT)
Age: 54
End: 2024-08-04

## 2025-01-15 ENCOUNTER — APPOINTMENT (OUTPATIENT)
Dept: URBAN - METROPOLITAN AREA CLINIC 253 | Age: 55
Setting detail: DERMATOLOGY
End: 2025-01-20

## 2025-01-15 VITALS — WEIGHT: 315 LBS | RESPIRATION RATE: 14 BRPM | HEIGHT: 68 IN

## 2025-01-15 DIAGNOSIS — D22 MELANOCYTIC NEVI: ICD-10-CM

## 2025-01-15 DIAGNOSIS — D18.0 HEMANGIOMA: ICD-10-CM

## 2025-01-15 DIAGNOSIS — Z71.89 OTHER SPECIFIED COUNSELING: ICD-10-CM

## 2025-01-15 DIAGNOSIS — B35.1 TINEA UNGUIUM: ICD-10-CM

## 2025-01-15 DIAGNOSIS — L57.3 POIKILODERMA OF CIVATTE: ICD-10-CM

## 2025-01-15 DIAGNOSIS — L82.1 OTHER SEBORRHEIC KERATOSIS: ICD-10-CM

## 2025-01-15 DIAGNOSIS — L81.4 OTHER MELANIN HYPERPIGMENTATION: ICD-10-CM

## 2025-01-15 PROBLEM — D18.01 HEMANGIOMA OF SKIN AND SUBCUTANEOUS TISSUE: Status: ACTIVE | Noted: 2025-01-15

## 2025-01-15 PROBLEM — D23.72 OTHER BENIGN NEOPLASM OF SKIN OF LEFT LOWER LIMB, INCLUDING HIP: Status: ACTIVE | Noted: 2025-01-15

## 2025-01-15 PROBLEM — D23.62 OTHER BENIGN NEOPLASM OF SKIN OF LEFT UPPER LIMB, INCLUDING SHOULDER: Status: ACTIVE | Noted: 2025-01-15

## 2025-01-15 PROBLEM — D22.5 MELANOCYTIC NEVI OF TRUNK: Status: ACTIVE | Noted: 2025-01-15

## 2025-01-15 PROCEDURE — 99213 OFFICE O/P EST LOW 20 MIN: CPT

## 2025-01-15 PROCEDURE — OTHER PRESCRIPTION MEDICATION MANAGEMENT: OTHER

## 2025-01-15 PROCEDURE — OTHER MIPS QUALITY: OTHER

## 2025-01-15 PROCEDURE — OTHER PHOTO-DOCUMENTATION: OTHER

## 2025-01-15 PROCEDURE — OTHER PRESCRIPTION: OTHER

## 2025-01-15 PROCEDURE — OTHER OTC TREATMENT REGIMEN: OTHER

## 2025-01-15 PROCEDURE — OTHER COUNSELING: OTHER

## 2025-01-15 RX ORDER — FLUCONAZOLE 150 MG/1
TABLET ORAL AS DIRECTED
Qty: 12 | Refills: 0 | Status: ERX | COMMUNITY
Start: 2025-01-15

## 2025-01-15 ASSESSMENT — LOCATION SIMPLE DESCRIPTION DERM
LOCATION SIMPLE: LOWER BACK
LOCATION SIMPLE: RIGHT FOOT
LOCATION SIMPLE: UPPER BACK
LOCATION SIMPLE: RIGHT ANTERIOR NECK
LOCATION SIMPLE: LEFT FOOT

## 2025-01-15 ASSESSMENT — LOCATION DETAILED DESCRIPTION DERM
LOCATION DETAILED: RIGHT DORSAL FOOT
LOCATION DETAILED: RIGHT SUPERIOR ANTERIOR NECK
LOCATION DETAILED: SUPERIOR LUMBAR SPINE
LOCATION DETAILED: INFERIOR THORACIC SPINE
LOCATION DETAILED: LEFT DORSAL FOOT

## 2025-01-15 ASSESSMENT — LOCATION ZONE DERM
LOCATION ZONE: NECK
LOCATION ZONE: TRUNK
LOCATION ZONE: FEET

## 2025-01-15 NOTE — PROCEDURE: OTC TREATMENT REGIMEN
Detail Level: Zone
Patient Specific Otc Recommendations (Will Not Stick From Patient To Patient): EltaMD Sunscreen UV Clear

## 2025-01-15 NOTE — PROCEDURE: PRESCRIPTION MEDICATION MANAGEMENT
Initiate Treatment: fluconazole 150 mg tablet once weekly x 3 months
Detail Level: Zone
Render In Strict Bullet Format?: No

## 2025-01-15 NOTE — HPI: FULL BODY SKIN EXAMINATION
What Type Of Note Output Would You Prefer (Optional)?: Standard Output
What Is The Reason For Today's Visit?: Full Body Skin Examination
What Is The Reason For Today's Visit? (Being Monitored For X): concerning skin lesions on a periodic basis
Additional History: Patient reports use of sun protection, although he states he tries to avoid direct sunlight when he can. He has a short history with tanning beds in his youth. He does not recall experiencing blistering sunburns.

## 2025-01-19 NOTE — PROCEDURE: COUNSELING
Detail Level: Generalized
Detail Level: Detailed
Detail Level: Zone
I attest my time as attending is greater than 50% of the total combined time spent on qualifying patient care activities by the PA/NP and attending.

## 2025-07-23 ENCOUNTER — APPOINTMENT (OUTPATIENT)
Dept: URBAN - METROPOLITAN AREA CLINIC 253 | Age: 55
Setting detail: DERMATOLOGY
End: 2025-07-28

## 2025-07-23 VITALS — RESPIRATION RATE: 14 BRPM | HEIGHT: 68 IN | WEIGHT: 180 LBS

## 2025-07-23 DIAGNOSIS — L57.0 ACTINIC KERATOSIS: ICD-10-CM

## 2025-07-23 DIAGNOSIS — D22 MELANOCYTIC NEVI: ICD-10-CM

## 2025-07-23 DIAGNOSIS — B35.1 TINEA UNGUIUM: ICD-10-CM

## 2025-07-23 DIAGNOSIS — L82.1 OTHER SEBORRHEIC KERATOSIS: ICD-10-CM

## 2025-07-23 DIAGNOSIS — D18.0 HEMANGIOMA: ICD-10-CM

## 2025-07-23 DIAGNOSIS — Z71.89 OTHER SPECIFIED COUNSELING: ICD-10-CM

## 2025-07-23 DIAGNOSIS — L81.4 OTHER MELANIN HYPERPIGMENTATION: ICD-10-CM

## 2025-07-23 PROBLEM — D23.62 OTHER BENIGN NEOPLASM OF SKIN OF LEFT UPPER LIMB, INCLUDING SHOULDER: Status: ACTIVE | Noted: 2025-07-23

## 2025-07-23 PROBLEM — D23.72 OTHER BENIGN NEOPLASM OF SKIN OF LEFT LOWER LIMB, INCLUDING HIP: Status: ACTIVE | Noted: 2025-07-23

## 2025-07-23 PROBLEM — D22.5 MELANOCYTIC NEVI OF TRUNK: Status: ACTIVE | Noted: 2025-07-23

## 2025-07-23 PROBLEM — D18.01 HEMANGIOMA OF SKIN AND SUBCUTANEOUS TISSUE: Status: ACTIVE | Noted: 2025-07-23

## 2025-07-23 PROCEDURE — OTHER PHOTO-DOCUMENTATION: OTHER

## 2025-07-23 PROCEDURE — OTHER COUNSELING: OTHER

## 2025-07-23 PROCEDURE — OTHER PRESCRIPTION: OTHER

## 2025-07-23 PROCEDURE — 99213 OFFICE O/P EST LOW 20 MIN: CPT | Mod: 25

## 2025-07-23 PROCEDURE — OTHER LIQUID NITROGEN: OTHER

## 2025-07-23 PROCEDURE — 17000 DESTRUCT PREMALG LESION: CPT

## 2025-07-23 PROCEDURE — OTHER MIPS QUALITY: OTHER

## 2025-07-23 PROCEDURE — OTHER ADDITIONAL NOTES: OTHER

## 2025-07-23 RX ORDER — TERBINAFINE HYDROCHLORIDE 250 MG/1
TABLET ORAL QD
Qty: 90 | Refills: 0 | Status: ERX | COMMUNITY
Start: 2025-07-23

## 2025-07-23 ASSESSMENT — LOCATION DETAILED DESCRIPTION DERM
LOCATION DETAILED: LEFT GREAT TOENAIL
LOCATION DETAILED: LEFT INFERIOR CENTRAL MALAR CHEEK
LOCATION DETAILED: INFERIOR THORACIC SPINE
LOCATION DETAILED: SUPERIOR LUMBAR SPINE

## 2025-07-23 ASSESSMENT — LOCATION SIMPLE DESCRIPTION DERM
LOCATION SIMPLE: LEFT CHEEK
LOCATION SIMPLE: UPPER BACK
LOCATION SIMPLE: LEFT GREAT TOE
LOCATION SIMPLE: LOWER BACK

## 2025-07-23 ASSESSMENT — LOCATION ZONE DERM
LOCATION ZONE: TOENAIL
LOCATION ZONE: TRUNK
LOCATION ZONE: FACE

## 2025-07-25 ENCOUNTER — APPOINTMENT (OUTPATIENT)
Dept: URBAN - METROPOLITAN AREA CLINIC 253 | Age: 55
Setting detail: DERMATOLOGY
End: 2025-07-28

## 2025-07-25 DIAGNOSIS — B35.1 TINEA UNGUIUM: ICD-10-CM

## 2025-07-25 PROCEDURE — OTHER ORDER TESTS: OTHER

## 2025-07-25 PROCEDURE — OTHER VENIPUNCTURE: OTHER

## 2025-07-25 PROCEDURE — 36415 COLL VENOUS BLD VENIPUNCTURE: CPT

## 2025-07-25 ASSESSMENT — LOCATION DETAILED DESCRIPTION DERM: LOCATION DETAILED: LEFT VENTRAL PROXIMAL FOREARM

## 2025-07-25 ASSESSMENT — LOCATION ZONE DERM: LOCATION ZONE: ARM

## 2025-07-25 ASSESSMENT — LOCATION SIMPLE DESCRIPTION DERM: LOCATION SIMPLE: LEFT FOREARM

## 2025-08-16 ENCOUNTER — HEALTH MAINTENANCE LETTER (OUTPATIENT)
Age: 55
End: 2025-08-16

## (undated) DEVICE — KIT HAND CONTROL ANGIOTOUCH ACIST 65CM AT-P65

## (undated) DEVICE — GW VASC OMNIWIRE J L185CM PRESSURE 89185J

## (undated) DEVICE — GUIDEWIRE VASC 0.035INX150CM INQWIRE J TIP IQ35F150J3F/A

## (undated) DEVICE — KIT LG BORE TOUHY ACCESS PLUS MAP152

## (undated) DEVICE — SLEEVE TR BAND RADIAL COMPRESSION DEVICE 24CM TRB24-REG

## (undated) DEVICE — DEFIB PRO-PADZ LVP LQD GEL ADULT 8900-2105-01

## (undated) DEVICE — MANIFOLD KIT ANGIO AUTOMATED 014613

## (undated) DEVICE — INTRO GLIDESHEATH SLENDER 6FR 10X45CM 60-1060

## (undated) DEVICE — CATH DIAGNOSTIC RADIAL 5FR TIG 4.0